# Patient Record
(demographics unavailable — no encounter records)

---

## 2025-01-04 NOTE — PHYSICAL EXAM
[Normal Coordination] : normal coordination [Normal DTR UE/LE] : normal DTR UE/LE  [Normal Sensation] : normal sensation [Normal Mood and Affect] : normal mood and affect [Oriented] : oriented [Able to Communicate] : able to communicate [Normal Skin] : normal skin [No Rash] : no rash [No Ulcers] : no ulcers [No Lesions] : no lesions [No obvious lymphadenopathy in areas examined] : no obvious lymphadenopathy in areas examined [Well Developed] : well developed [Peripheral vascular exam is grossly normal] : peripheral vascular exam is grossly normal [No Respiratory Distress] : no respiratory distress [4___] : left triceps 4[unfilled]/5 [3___] : left grasp 3[unfilled]/5 [NL (90)] : forward flexion 90 degrees [NL (30)] : right lateral rotation 30 degrees [NL (45)] : extension 45 degrees [NL (40)] : right lateral bending 40 degrees [5___] : right extensor hallicus longus 5[unfilled]/5 [Flexion] : flexion [de-identified] : RT UE intact. Left MP extensors 4/5 [] : non-antalgic

## 2025-01-04 NOTE — DISCUSSION/SUMMARY
[de-identified] : Lumbar/Cervical Spine: EMG demonstrates inflammation in multiple nerve roots. longer standing inflammation at C5/6.  There is no nerve impingement in C7 and C8. Very mild stenosis C7 nerve root. Amount of nerve compression on MRI study is not severe enough for explanation of significant weakness present in the left hand.  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 Per patient report upper extremity symptoms have improved significantly over time and he continues to treat, Patient with persistent low back pain refractory to extensive physical therapy and medical management with newer onset right lower extremity radicular pain. Last lumbar mri about 2 years ago. Referral for updated lumbar spine MRI provided to patient. Lumbar spine/core strengthening exercise program provided to patient. Cumulative encounter duration exceeded 30 minutes.  Follow up after MRI  Prior to appointment and during encounter with patient extensive medical records were reviewed including but not limited to, hospital records, outpatient records, imaging results, and lab data. During this appointment the patient was examined, diagnoses were discussed and explained in a face to face manner. In addition extensive time was spent reviewing aforementioned diagnostic studies. Counseling including abnormal image results, differential diagnoses, treatment options, risk and benefits, lifestyle changes, current condition, and current medications was performed. Patient's comments, questions, and concerns were addressed and patient verbalized understanding. Based on this patient's presentation at our office, which is an orthopedic spine surgeon's office, this patient inherently / intrinsically has a risk, however minute, of developing issues such as Cauda equina syndrome, bowel and bladder changes, or progression of motor or neurological deficits such as paralysis which may be permanent.  YOLIS AGUERO acting as a Scribe for Dr. Davon LEGER, Yolis Aguero, attest that this documentation has been prepared under the direction and in the presence of Provider Basim Mays MD.

## 2025-01-04 NOTE — HISTORY OF PRESENT ILLNESS
[Neck] : neck [Mid-back] : mid-back [9] : 9 [7] : 7 [Radiating] : radiating [Sharp] : sharp [Shooting] : shooting [Tingling] : tingling [Part time] : Work status: part time [de-identified] : 01/03/2025: Patient is here today for a follow up visit. Patient reports right side L4-L5 has worsened. Reports resolving atrophy in regards to cervical spine. Still minor numbness, tingling in left arm. Current issue is worsened pain in lower back near tailbone. He is experiencing right sided leg pain that radiates to hip, knee.  06/19/2024: patient here for follow up visit. Reports 75-90% improvement in strength. Experiences intermittent numbness in the left arm and first 3 digits of hand. Reports he lost 21 pounds through exercising. Neck pain has improved, but still reports pain in lower back. Found that the more he exercises, the more he experiences pain in hips and low back. Left side - above buttock gets stiff; right side- sciatic radiates down leg to knee. Wants to continue to exercise and be active. Pain level normally is 1-2/10, but increases with exercise. Repetitive bending causes fatigue of lower lumbar. He reports a need for more frequent stretching to keep pain at bay. Heat started making pain worse 6 months ago. Reports minor loss of strength in right leg.   08/09/2023 - Patient presenting for a FUV. Chief complaint c/t to be pain more prevalent in the left-hand and diminished  strength. States pins/needles and numbness in the left forearm region, intermittent. Wore left UE brace for 6 wks. Underwent MMRI of the left upper extremity. Follows up with Dr. Cronin, recommended to now use brace at nighttime. No neck pain at this time.   6/14/2023 - Patient presenting for a follow up and MRI Review of L spine. He reports sharp pain on the hip bone present at his last visit has significant reduced. He complains of baseline stiffness focal to the RT low back, currently controlled. Denies any frequent sciatic like symptoms.   6/2/2023 - 62 y/o M presenting for an evaluation of C, T, and L spine. Patient states he fell out of bed - this fall resulted in central low back pain radiating into left paraspinal (7/10 pain level in office today). Also c/o sharp deep left hip pain on the bone. He reports cervical neck pain subsided after PT. However, he continues to have numbness / loss of function in the left am. Weakness remains present in the left hand. Atrophy in the left hand over the last few weeks. Left hand is his dominant hand. No right upper extremity symptoms. He has discontinued PT for the past 1.5 wk since his newer lumbar issues. Taking NSAID and completing PT and pains are breaking through.   5/19/23: Pt presents to office for follow up of C and T spine. Improvements since he was last seen. No more pain in the neck, left arm, and upper back. C/o intermittent tingling in the left upper extremity, worse at nighttime and lying down. Experiencing left hip pain after massage at acupuncture. Left upper extremity strength is gradually improving day by day. PT is helpful.   4/14/23 Pt presents to office for follow up of C and T spine. He is here today to review MRI results from [facility]. He continues to report pain which affects his ADLs. The pain is mostly on the left side. He use to weight lift and run which he stopped doing. Previous XR of thoracic and cervical spine reveals arthritis. No right sided upper extremity symptoms. He is continuing PT and taking medications as prescribed.   4/7/23: 62 y/o M presenting for an initial evaluation of T & C Spine. Chief complaint is left sided neck pain radiating into the shoulder blade/throughout left arm starting 14 days ago. No trauma. States pain started after sleeping. Pain is worse while lying down.Patient reports subjective weakness in the LT UE (50 % deficit). Patient received EKG, abnormalities were rules out. No right sided upper extremity symptoms. Pt was rxd MDP with no relief, and is currently taking gabapentin 300 mg and naproxen. Prescribed muscle relaxer provided minimal relief for approx 1 hr, he has d/c. Pmhx of DDD of the lumbar spine. Completing cervical HEP. Has not started PT due to severity of his pains.     [] : no [FreeTextEntry3] : 2 weeks ago [FreeTextEntry5] : Pt p/w thoracic and cervical spine pain that began 2 weeks ago after sleeping the wrong way. pt reports radicular sxs down the left arm with numbness, weakness and tingling. Pt was rxd MDP with no relief, and is currently taking gabapentin and naproxen.\par   pmhx of DDD of the lumbar spine. [de-identified] : PCP [de-identified] : XR of cervical spine ([facility]) [de-identified] : p/t [de-identified] :

## 2025-01-04 NOTE — HISTORY OF PRESENT ILLNESS
[Neck] : neck [Mid-back] : mid-back [9] : 9 [7] : 7 [Radiating] : radiating [Sharp] : sharp [Shooting] : shooting [Tingling] : tingling [Part time] : Work status: part time [de-identified] : 01/03/2025: Patient is here today for a follow up visit. Patient reports right side L4-L5 has worsened. Reports resolving atrophy in regards to cervical spine. Still minor numbness, tingling in left arm. Current issue is worsened pain in lower back near tailbone. He is experiencing right sided leg pain that radiates to hip, knee.  06/19/2024: patient here for follow up visit. Reports 75-90% improvement in strength. Experiences intermittent numbness in the left arm and first 3 digits of hand. Reports he lost 21 pounds through exercising. Neck pain has improved, but still reports pain in lower back. Found that the more he exercises, the more he experiences pain in hips and low back. Left side - above buttock gets stiff; right side- sciatic radiates down leg to knee. Wants to continue to exercise and be active. Pain level normally is 1-2/10, but increases with exercise. Repetitive bending causes fatigue of lower lumbar. He reports a need for more frequent stretching to keep pain at bay. Heat started making pain worse 6 months ago. Reports minor loss of strength in right leg.   08/09/2023 - Patient presenting for a FUV. Chief complaint c/t to be pain more prevalent in the left-hand and diminished  strength. States pins/needles and numbness in the left forearm region, intermittent. Wore left UE brace for 6 wks. Underwent MMRI of the left upper extremity. Follows up with Dr. Cronin, recommended to now use brace at nighttime. No neck pain at this time.   6/14/2023 - Patient presenting for a follow up and MRI Review of L spine. He reports sharp pain on the hip bone present at his last visit has significant reduced. He complains of baseline stiffness focal to the RT low back, currently controlled. Denies any frequent sciatic like symptoms.   6/2/2023 - 62 y/o M presenting for an evaluation of C, T, and L spine. Patient states he fell out of bed - this fall resulted in central low back pain radiating into left paraspinal (7/10 pain level in office today). Also c/o sharp deep left hip pain on the bone. He reports cervical neck pain subsided after PT. However, he continues to have numbness / loss of function in the left am. Weakness remains present in the left hand. Atrophy in the left hand over the last few weeks. Left hand is his dominant hand. No right upper extremity symptoms. He has discontinued PT for the past 1.5 wk since his newer lumbar issues. Taking NSAID and completing PT and pains are breaking through.   5/19/23: Pt presents to office for follow up of C and T spine. Improvements since he was last seen. No more pain in the neck, left arm, and upper back. C/o intermittent tingling in the left upper extremity, worse at nighttime and lying down. Experiencing left hip pain after massage at acupuncture. Left upper extremity strength is gradually improving day by day. PT is helpful.   4/14/23 Pt presents to office for follow up of C and T spine. He is here today to review MRI results from [facility]. He continues to report pain which affects his ADLs. The pain is mostly on the left side. He use to weight lift and run which he stopped doing. Previous XR of thoracic and cervical spine reveals arthritis. No right sided upper extremity symptoms. He is continuing PT and taking medications as prescribed.   4/7/23: 62 y/o M presenting for an initial evaluation of T & C Spine. Chief complaint is left sided neck pain radiating into the shoulder blade/throughout left arm starting 14 days ago. No trauma. States pain started after sleeping. Pain is worse while lying down.Patient reports subjective weakness in the LT UE (50 % deficit). Patient received EKG, abnormalities were rules out. No right sided upper extremity symptoms. Pt was rxd MDP with no relief, and is currently taking gabapentin 300 mg and naproxen. Prescribed muscle relaxer provided minimal relief for approx 1 hr, he has d/c. Pmhx of DDD of the lumbar spine. Completing cervical HEP. Has not started PT due to severity of his pains.     [] : no [FreeTextEntry3] : 2 weeks ago [FreeTextEntry5] : Pt p/w thoracic and cervical spine pain that began 2 weeks ago after sleeping the wrong way. pt reports radicular sxs down the left arm with numbness, weakness and tingling. Pt was rxd MDP with no relief, and is currently taking gabapentin and naproxen.\par   pmhx of DDD of the lumbar spine. [de-identified] : PCP [de-identified] : XR of cervical spine ([facility]) [de-identified] : p/t [de-identified] :

## 2025-01-04 NOTE — PHYSICAL EXAM
[Normal Coordination] : normal coordination [Normal DTR UE/LE] : normal DTR UE/LE  [Normal Sensation] : normal sensation [Normal Mood and Affect] : normal mood and affect [Oriented] : oriented [Able to Communicate] : able to communicate [Normal Skin] : normal skin [No Rash] : no rash [No Ulcers] : no ulcers [No Lesions] : no lesions [No obvious lymphadenopathy in areas examined] : no obvious lymphadenopathy in areas examined [Well Developed] : well developed [Peripheral vascular exam is grossly normal] : peripheral vascular exam is grossly normal [No Respiratory Distress] : no respiratory distress [4___] : left triceps 4[unfilled]/5 [3___] : left grasp 3[unfilled]/5 [NL (90)] : forward flexion 90 degrees [NL (30)] : right lateral rotation 30 degrees [NL (45)] : extension 45 degrees [NL (40)] : right lateral bending 40 degrees [5___] : right extensor hallicus longus 5[unfilled]/5 [Flexion] : flexion [de-identified] : RT UE intact. Left MP extensors 4/5 [] : non-antalgic

## 2025-01-04 NOTE — DISCUSSION/SUMMARY
[de-identified] : Lumbar/Cervical Spine: EMG demonstrates inflammation in multiple nerve roots. longer standing inflammation at C5/6.  There is no nerve impingement in C7 and C8. Very mild stenosis C7 nerve root. Amount of nerve compression on MRI study is not severe enough for explanation of significant weakness present in the left hand.  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 Per patient report upper extremity symptoms have improved significantly over time and he continues to treat, Patient with persistent low back pain refractory to extensive physical therapy and medical management with newer onset right lower extremity radicular pain. Last lumbar mri about 2 years ago. Referral for updated lumbar spine MRI provided to patient. Lumbar spine/core strengthening exercise program provided to patient. Cumulative encounter duration exceeded 30 minutes.  Follow up after MRI  Prior to appointment and during encounter with patient extensive medical records were reviewed including but not limited to, hospital records, outpatient records, imaging results, and lab data. During this appointment the patient was examined, diagnoses were discussed and explained in a face to face manner. In addition extensive time was spent reviewing aforementioned diagnostic studies. Counseling including abnormal image results, differential diagnoses, treatment options, risk and benefits, lifestyle changes, current condition, and current medications was performed. Patient's comments, questions, and concerns were addressed and patient verbalized understanding. Based on this patient's presentation at our office, which is an orthopedic spine surgeon's office, this patient inherently / intrinsically has a risk, however minute, of developing issues such as Cauda equina syndrome, bowel and bladder changes, or progression of motor or neurological deficits such as paralysis which may be permanent.  YOLIS AGUERO acting as a Scribe for Dr. Dvaon LEGER, Yolis Aguero, attest that this documentation has been prepared under the direction and in the presence of Provider Basim Mays MD.

## 2025-01-04 NOTE — DATA REVIEWED
[EMG Nerve Conduction] : A EMG Nerve Conduction test was completed of the [Bilateral] : bilateral [Upper extremity] : upper extremity [MRI] : MRI [Cervical Spine] : cervical spine [Report was reviewed and noted in the chart] : The report was reviewed and noted in the chart [I reviewed the films/CD and additionally noted] : I reviewed the films/CD and additionally noted [I independently reviewed and interpreted images and report] : I independently reviewed and interpreted images and report [FreeTextEntry2] : in office x-rays Lumbar spine ap/lat 06/19/2024 demonstrates anterior osteophytes close to Autofusion L4/5 L5S1 w persistent multi-level DDD [FreeTextEntry3] : in office x-rays b/l hips ap/lat 06/19/2024 demonstrates reasonable preservation of hip joint cartilage  [FreeTextEntry1] : I stop paperwork reviewed PAin mgmt progress notes reviewed Hand orthopedic progress notes reviewed

## 2025-01-23 NOTE — DATA REVIEWED
[EMG Nerve Conduction] : A EMG Nerve Conduction test was completed of the [Bilateral] : bilateral [Upper extremity] : upper extremity [MRI] : MRI [Cervical Spine] : cervical spine [Report was reviewed and noted in the chart] : The report was reviewed and noted in the chart [I reviewed the films/CD and additionally noted] : I reviewed the films/CD and additionally noted [I independently reviewed and interpreted images and report] : I independently reviewed and interpreted images and report [FreeTextEntry2] : in office x-rays Lumbar spine ap/lat 06/19/2024 demonstrates anterior osteophytes close to Autofusion L4/5 L5S1 w persistent multi-level DDD [FreeTextEntry3] : in office x-rays b/l hips ap/lat 06/19/2024 demonstrates reasonable preservation of hip joint cartilage  [FreeTextEntry1] : MRI Lumbar Spine from [place] Multilevel chronic DDD, no central stenosis no nerve root compression,  Moderate bilateral foraminal stenosis L4/L5. Modic changes L4-5, L2-3, L5S1

## 2025-01-23 NOTE — HISTORY OF PRESENT ILLNESS
[Neck] : neck [Mid-back] : mid-back [9] : 9 [7] : 7 [Radiating] : radiating [Sharp] : sharp [Shooting] : shooting [Tingling] : tingling [Part time] : Work status: part time [de-identified] : 01/22/2025: Patient presents for follow up visit and review of MRI.  Patient reports when he does home exercises he does feel relief. He states his right sided leg pain is beginning to resolve slowly. He does have pressure in his lower back. Patient does not take anything for pain. He does occasionally ice and heat.   01/03/2025: Patient is here today for a follow up visit. Patient reports right side L4-L5 has worsened. Reports resolving atrophy in regards to cervical spine. Still minor numbness, tingling in left arm. Current issue is worsened pain in lower back near tailbone. He is experiencing right sided leg pain that radiates to hip, knee.  06/19/2024: patient here for follow up visit. Reports 75-90% improvement in strength. Experiences intermittent numbness in the left arm and first 3 digits of hand. Reports he lost 21 pounds through exercising. Neck pain has improved, but still reports pain in lower back. Found that the more he exercises, the more he experiences pain in hips and low back. Left side - above buttock gets stiff; right side- sciatic radiates down leg to knee. Wants to continue to exercise and be active. Pain level normally is 1-2/10, but increases with exercise. Repetitive bending causes fatigue of lower lumbar. He reports a need for more frequent stretching to keep pain at bay. Heat started making pain worse 6 months ago. Reports minor loss of strength in right leg.   08/09/2023 - Patient presenting for a FUV. Chief complaint c/t to be pain more prevalent in the left-hand and diminished  strength. States pins/needles and numbness in the left forearm region, intermittent. Wore left UE brace for 6 wks. Underwent MMRI of the left upper extremity. Follows up with Dr. Cronin, recommended to now use brace at nighttime. No neck pain at this time.   6/14/2023 - Patient presenting for a follow up and MRI Review of L spine. He reports sharp pain on the hip bone present at his last visit has significant reduced. He complains of baseline stiffness focal to the RT low back, currently controlled. Denies any frequent sciatic like symptoms.   6/2/2023 - 60 y/o M presenting for an evaluation of C, T, and L spine. Patient states he fell out of bed - this fall resulted in central low back pain radiating into left paraspinal (7/10 pain level in office today). Also c/o sharp deep left hip pain on the bone. He reports cervical neck pain subsided after PT. However, he continues to have numbness / loss of function in the left am. Weakness remains present in the left hand. Atrophy in the left hand over the last few weeks. Left hand is his dominant hand. No right upper extremity symptoms. He has discontinued PT for the past 1.5 wk since his newer lumbar issues. Taking NSAID and completing PT and pains are breaking through.   5/19/23: Pt presents to office for follow up of C and T spine. Improvements since he was last seen. No more pain in the neck, left arm, and upper back. C/o intermittent tingling in the left upper extremity, worse at nighttime and lying down. Experiencing left hip pain after massage at acupuncture. Left upper extremity strength is gradually improving day by day. PT is helpful.   4/14/23 Pt presents to office for follow up of C and T spine. He is here today to review MRI results from [facility]. He continues to report pain which affects his ADLs. The pain is mostly on the left side. He use to weight lift and run which he stopped doing. Previous XR of thoracic and cervical spine reveals arthritis. No right sided upper extremity symptoms. He is continuing PT and taking medications as prescribed.   4/7/23: 60 y/o M presenting for an initial evaluation of T & C Spine. Chief complaint is left sided neck pain radiating into the shoulder blade/throughout left arm starting 14 days ago. No trauma. States pain started after sleeping. Pain is worse while lying down.Patient reports subjective weakness in the LT UE (50 % deficit). Patient received EKG, abnormalities were rules out. No right sided upper extremity symptoms. Pt was rxd MDP with no relief, and is currently taking gabapentin 300 mg and naproxen. Prescribed muscle relaxer provided minimal relief for approx 1 hr, he has d/c. Pmhx of DDD of the lumbar spine. Completing cervical HEP. Has not started PT due to severity of his pains.     [] : no [FreeTextEntry3] : 2 weeks ago [FreeTextEntry5] : Pt p/w thoracic and cervical spine pain that began 2 weeks ago after sleeping the wrong way. pt reports radicular sxs down the left arm with numbness, weakness and tingling. Pt was rxd MDP with no relief, and is currently taking gabapentin and naproxen.\par   pmhx of DDD of the lumbar spine. [de-identified] : PCP [de-identified] : XR of cervical spine ([facility]) [de-identified] : p/t [de-identified] :

## 2025-01-23 NOTE — HISTORY OF PRESENT ILLNESS
[Neck] : neck [Mid-back] : mid-back [9] : 9 [7] : 7 [Radiating] : radiating [Sharp] : sharp [Shooting] : shooting [Tingling] : tingling [Part time] : Work status: part time [de-identified] : 01/22/2025: Patient presents for follow up visit and review of MRI.  Patient reports when he does home exercises he does feel relief. He states his right sided leg pain is beginning to resolve slowly. He does have pressure in his lower back. Patient does not take anything for pain. He does occasionally ice and heat.   01/03/2025: Patient is here today for a follow up visit. Patient reports right side L4-L5 has worsened. Reports resolving atrophy in regards to cervical spine. Still minor numbness, tingling in left arm. Current issue is worsened pain in lower back near tailbone. He is experiencing right sided leg pain that radiates to hip, knee.  06/19/2024: patient here for follow up visit. Reports 75-90% improvement in strength. Experiences intermittent numbness in the left arm and first 3 digits of hand. Reports he lost 21 pounds through exercising. Neck pain has improved, but still reports pain in lower back. Found that the more he exercises, the more he experiences pain in hips and low back. Left side - above buttock gets stiff; right side- sciatic radiates down leg to knee. Wants to continue to exercise and be active. Pain level normally is 1-2/10, but increases with exercise. Repetitive bending causes fatigue of lower lumbar. He reports a need for more frequent stretching to keep pain at bay. Heat started making pain worse 6 months ago. Reports minor loss of strength in right leg.   08/09/2023 - Patient presenting for a FUV. Chief complaint c/t to be pain more prevalent in the left-hand and diminished  strength. States pins/needles and numbness in the left forearm region, intermittent. Wore left UE brace for 6 wks. Underwent MMRI of the left upper extremity. Follows up with Dr. Cronin, recommended to now use brace at nighttime. No neck pain at this time.   6/14/2023 - Patient presenting for a follow up and MRI Review of L spine. He reports sharp pain on the hip bone present at his last visit has significant reduced. He complains of baseline stiffness focal to the RT low back, currently controlled. Denies any frequent sciatic like symptoms.   6/2/2023 - 62 y/o M presenting for an evaluation of C, T, and L spine. Patient states he fell out of bed - this fall resulted in central low back pain radiating into left paraspinal (7/10 pain level in office today). Also c/o sharp deep left hip pain on the bone. He reports cervical neck pain subsided after PT. However, he continues to have numbness / loss of function in the left am. Weakness remains present in the left hand. Atrophy in the left hand over the last few weeks. Left hand is his dominant hand. No right upper extremity symptoms. He has discontinued PT for the past 1.5 wk since his newer lumbar issues. Taking NSAID and completing PT and pains are breaking through.   5/19/23: Pt presents to office for follow up of C and T spine. Improvements since he was last seen. No more pain in the neck, left arm, and upper back. C/o intermittent tingling in the left upper extremity, worse at nighttime and lying down. Experiencing left hip pain after massage at acupuncture. Left upper extremity strength is gradually improving day by day. PT is helpful.   4/14/23 Pt presents to office for follow up of C and T spine. He is here today to review MRI results from [facility]. He continues to report pain which affects his ADLs. The pain is mostly on the left side. He use to weight lift and run which he stopped doing. Previous XR of thoracic and cervical spine reveals arthritis. No right sided upper extremity symptoms. He is continuing PT and taking medications as prescribed.   4/7/23: 62 y/o M presenting for an initial evaluation of T & C Spine. Chief complaint is left sided neck pain radiating into the shoulder blade/throughout left arm starting 14 days ago. No trauma. States pain started after sleeping. Pain is worse while lying down.Patient reports subjective weakness in the LT UE (50 % deficit). Patient received EKG, abnormalities were rules out. No right sided upper extremity symptoms. Pt was rxd MDP with no relief, and is currently taking gabapentin 300 mg and naproxen. Prescribed muscle relaxer provided minimal relief for approx 1 hr, he has d/c. Pmhx of DDD of the lumbar spine. Completing cervical HEP. Has not started PT due to severity of his pains.     [] : no [FreeTextEntry3] : 2 weeks ago [FreeTextEntry5] : Pt p/w thoracic and cervical spine pain that began 2 weeks ago after sleeping the wrong way. pt reports radicular sxs down the left arm with numbness, weakness and tingling. Pt was rxd MDP with no relief, and is currently taking gabapentin and naproxen.\par   pmhx of DDD of the lumbar spine. [de-identified] : PCP [de-identified] : XR of cervical spine ([facility]) [de-identified] : p/t [de-identified] :

## 2025-01-23 NOTE — PHYSICAL EXAM
[Normal Coordination] : normal coordination [Normal DTR UE/LE] : normal DTR UE/LE  [Normal Sensation] : normal sensation [Normal Mood and Affect] : normal mood and affect [Oriented] : oriented [Able to Communicate] : able to communicate [Normal Skin] : normal skin [No Rash] : no rash [No Ulcers] : no ulcers [No Lesions] : no lesions [No obvious lymphadenopathy in areas examined] : no obvious lymphadenopathy in areas examined [Well Developed] : well developed [Peripheral vascular exam is grossly normal] : peripheral vascular exam is grossly normal [No Respiratory Distress] : no respiratory distress [4___] : left triceps 4[unfilled]/5 [3___] : left grasp 3[unfilled]/5 [NL (90)] : forward flexion 90 degrees [NL (30)] : right lateral rotation 30 degrees [NL (45)] : extension 45 degrees [NL (40)] : right lateral bending 40 degrees [Flexion] : flexion [5___] : right extensor hallicus longus 5[unfilled]/5 [de-identified] : RT UE intact. Left MP extensors 4/5 [] : non-antalgic

## 2025-01-23 NOTE — PHYSICAL EXAM
[Normal Coordination] : normal coordination [Normal DTR UE/LE] : normal DTR UE/LE  [Normal Sensation] : normal sensation [Normal Mood and Affect] : normal mood and affect [Oriented] : oriented [Able to Communicate] : able to communicate [Normal Skin] : normal skin [No Rash] : no rash [No Ulcers] : no ulcers [No Lesions] : no lesions [No obvious lymphadenopathy in areas examined] : no obvious lymphadenopathy in areas examined [Well Developed] : well developed [Peripheral vascular exam is grossly normal] : peripheral vascular exam is grossly normal [No Respiratory Distress] : no respiratory distress [4___] : left triceps 4[unfilled]/5 [3___] : left grasp 3[unfilled]/5 [NL (90)] : forward flexion 90 degrees [NL (30)] : right lateral rotation 30 degrees [NL (45)] : extension 45 degrees [NL (40)] : right lateral bending 40 degrees [Flexion] : flexion [5___] : right extensor hallicus longus 5[unfilled]/5 [de-identified] : RT UE intact. Left MP extensors 4/5 [] : non-antalgic

## 2025-01-23 NOTE — DATA REVIEWED
[EMG Nerve Conduction] : A EMG Nerve Conduction test was completed of the [Bilateral] : bilateral [Upper extremity] : upper extremity [MRI] : MRI [Cervical Spine] : cervical spine [Report was reviewed and noted in the chart] : The report was reviewed and noted in the chart [I reviewed the films/CD and additionally noted] : I reviewed the films/CD and additionally noted [I independently reviewed and interpreted images and report] : I independently reviewed and interpreted images and report [FreeTextEntry2] : in office x-rays Lumbar spine ap/lat 06/19/2024 demonstrates anterior osteophytes close to Autofusion L4/5 L5S1 w persistent multi-level DDD [FreeTextEntry3] : in office x-rays b/l hips ap/lat 06/19/2024 demonstrates reasonable preservation of hip joint cartilage  [FreeTextEntry1] : MRI Lumbar Spine from [place] Multilevel chronic DDD, no central stenosis no nerve root compression,  Moderate bilateral foraminal stenosis L4/L5. Modic changes L4-5, L2-3, L5S1

## 2025-01-23 NOTE — DISCUSSION/SUMMARY
[de-identified] : Reviewed and discussed MRI results of lumbar spine. Discussed with patient seeing pain management to further discuss a basivertebral nerve ablation procedure can be an option to help alleviate pain. Referral for Dr. Monique given today. Discussed with patient that surgical intervention is an option, but we will try conservative treatment first, given that surgery would likely require a four level lumbar decompression and fusion with extended recovery time and may not meet patient expectations with respect to symptom relief..  Continue Lumbar spine/core strengthening exercise program provided to patient. Cumulative encounter duration exceeded 30 minutes.  Follow up:  1 month  Prior to appointment and during encounter with patient extensive medical records were reviewed including but not limited to, hospital records, outpatient records, imaging results, and lab data. During this appointment the patient was examined, diagnoses were discussed and explained in a face to face manner. In addition, extensive time was spent reviewing aforementioned diagnostic studies. Counseling including abnormal image results, differential diagnoses, treatment options, risk and benefits, lifestyle changes, current condition, and current medications was performed. Patient's comments, questions, and concerns were addressed and patient verbalized understanding. Based on this patient's presentation at our office, which is an orthopedic spine surgeon's office, this patient inherently / intrinsically has a risk, however minute, of developing issues such as Cauda equina syndrome, bowel and bladder changes, or progression of motor or neurological deficits such as paralysis which may be permanent.   I, Genna Lopez, attest that this documentation has been prepared under the direction and in the presence of Provider Basim Mays MD.

## 2025-01-23 NOTE — DISCUSSION/SUMMARY
[de-identified] : Reviewed and discussed MRI results of lumbar spine. Discussed with patient seeing pain management to further discuss a basivertebral nerve ablation procedure can be an option to help alleviate pain. Referral for Dr. Monique given today. Discussed with patient that surgical intervention is an option, but we will try conservative treatment first, given that surgery would likely require a four level lumbar decompression and fusion with extended recovery time and may not meet patient expectations with respect to symptom relief..  Continue Lumbar spine/core strengthening exercise program provided to patient. Cumulative encounter duration exceeded 30 minutes.  Follow up:  1 month  Prior to appointment and during encounter with patient extensive medical records were reviewed including but not limited to, hospital records, outpatient records, imaging results, and lab data. During this appointment the patient was examined, diagnoses were discussed and explained in a face to face manner. In addition, extensive time was spent reviewing aforementioned diagnostic studies. Counseling including abnormal image results, differential diagnoses, treatment options, risk and benefits, lifestyle changes, current condition, and current medications was performed. Patient's comments, questions, and concerns were addressed and patient verbalized understanding. Based on this patient's presentation at our office, which is an orthopedic spine surgeon's office, this patient inherently / intrinsically has a risk, however minute, of developing issues such as Cauda equina syndrome, bowel and bladder changes, or progression of motor or neurological deficits such as paralysis which may be permanent.   I, Genna Lopez, attest that this documentation has been prepared under the direction and in the presence of Provider Basim Mays MD.

## 2025-02-07 NOTE — PHYSICAL EXAM
[de-identified] : Constitutional:   - No acute distress   - Well developed; well nourished    Neurological:   - normal mood and affect   - alert and oriented x 3     Cardiovascular:   - grossly normal   Lumbar Spine Exam:   Inspection: erythema (-) ecchymosis (-) rashes (-) alignment: no scoliosis   Palpation: Midline lumbar tenderness:            (-) midline thoracic tenderness:          (-) Lumbar paraspinal tenderness:      L (-); R (-) thoracic paraspinal tenderness:     L (-); R (-) sciatic nerve tenderness :              L (-); R (-) SI joint tenderness:                        L (-); R (-) GTB tenderness:                            L (-); R (-)   ROM:  WNL   Strength:                                    Right       Left  Hip Flexion:                (5/5)       (5/5) Quadriceps:               (5/5)       (5/5) Hamstrings:                (5/5)       (5/5) Ankle Dorsiflexion:     (5/5)       (5/5) EHL:                           (5/5)       (5/5) Ankle Plantarflexion:  (5/5)       (5/5)   Special Tests: SLR:                           R (-) ; L (-) Facet loading:            R (-) ; L (-) EUNICE test:               R (n/a) ; L (n/a) Hamstring tightness:  R (-);  L (-)   Neurologic: SI LT throughout right lower extremity SI LT throughout left lower extremity   Reflexes normal and symmetric bilateral lower extremities   Gait: non- antalgic gait ambulates without assistive device
normal...

## 2025-02-07 NOTE — ASSESSMENT
[FreeTextEntry1] : A discussion regarding available pain management treatment options occurred with the patient.  These included interventional, rehabilitative, pharmacological, and alternative modalities. We will proceed with the following:    Interventional treatment options:   - Proceed with TPI today for acute lumbar myofascial strain - Can consider left PM C7-T1 VIOLETTA pending review of EMG/NCV study - Explained potential diagnostic and therapeutic role for indicated procedure - see additional instructions below    Rehabilitative options: - continue physical therapy   - participation in active HEP was discussed    Medication based treatment options:   - Continue naproxen 500 mg up to BID as needed - continue methocarbamol 750 mg up to BID as needed for spasm - see additional instructions below    Complementary treatment options:   - Weight management and lifestyle modifications discussed   - See additional instructions below    Additional treatment recommendations as follows:   - Agree with EMG/NCV for left upper extremity weakness/atrophy - Obtain MRI lumbar spine - Follow-up for diagnostic study review  The risks, benefits and alternatives of the proposed procedure were explained in detail with the patient.  The risks outlined include, but are not limited to, infection, bleeding, nerve injury, a temporary increase in pain, failure to resolve symptoms, allergic reaction, and possible elevation of blood sugar in diabetics.  All questions were answered to patient's apparent satisfaction and he/she verbalized an understanding.  We have discussed the risks, benefits, and alternatives NSAID therapy including but not limited to the risk of bleeding, thrombosis, gastric mucosal irritation/ulceration, allergic reaction and kidney dysfunction; the patient verbalizes an understanding.  The documentation recorded by the scribe, in my presence, accurately reflects the service I personally performed and the decisions made by me with my edits as appropriate.   I, Gina Moreno, acting as scribe, attest that this documentation has been prepared under the direction and in the presence of Provider Ubaldo Monique DO.

## 2025-02-07 NOTE — HISTORY OF PRESENT ILLNESS
[FreeTextEntry1] : 2025 - Patient presents for reevaluation.  Last seen nearly 2 years ago.  2023 - The patient presents for initial evaluation regarding their neck pain.  Patient was referred by Dr. Mays.  Patient with history of chronic neck pain with recent exacerbation of the previous 2 months.  Denies any particular exacerbating and or inciting events.  His current pain is in the neck with radicular pain to the left arm.  There is associated paresthesias of the arm extending to the hand.  He also notes significant muscle atrophy of the hand and subjective strength loss.  Patient has been through PT with some benefit.  Of note patient also complains of chronic low back pain with recent exacerbation while in physical therapy.  This is actually more of a concern for him at today's visit than his cervical spine.  Pain remains focal to the lumbar spine without significant radiation to the buttock or lower extremities.  Patient is scheduled for a MRI of his lumbar spine this week.  Injections: No    Pertinent Surgical History: N/A   Imagin) MRI Cervical Spine (2023) - [facility]  2) MRI lumbar spine (1/15/2025) - [place] [facility]  Physician Disclaimer: I have personally reviewed and confirmed all HPI data with the patient.

## 2025-03-06 NOTE — ASSESSMENT
[FreeTextEntry1] : We discussed the nature of the underlying pathology and available pain management treatment options. These included interventional, rehabilitative, pharmacological, and complementary modalities. We will proceed with the following:    Interventional treatment options: - Proceed with _ with fluoroscopic guidance - None indicated at present time  - see additional instructions below    Rehabilitative options: - Initiate trial of physical therapy - Continue physical therapy - Participation in active HEP was discussed and encouraged as tolerated - Home exercise sheet provided   Medication based treatment options: - Initiate trial of _ - Continue _ - See additional instructions below    Complementary treatment options: - Weight management and lifestyle modifications discussed   Additional treatment recommendations as follows: - patient will follow up _  IGina, acting as scribe, attest that this documentation has been prepared under the direction and in the presence of Provider Ubaldo Monique DO.  The documentation recorded by the scribe, in my presence, accurately reflects the service I personally performed, and the decisions made by me with my edits as appropriate.

## 2025-03-06 NOTE — PHYSICAL EXAM
[de-identified] : Constitutional: - No acute distress - Well developed; well nourished   Neurological: - normal mood and affect - alert and oriented x 3   Cardiovascular: - grossly normal  Lumbar Spine Exam:   Inspection: erythema (-) ecchymosis (-) rashes (-) alignment: no scoliosis   Palpation: Midline lumbar tenderness:            (-) midline thoracic tenderness:          (-) Lumbar paraspinal tenderness:      L (-); R (-) thoracic paraspinal tenderness:     L (-); R (-) sciatic nerve tenderness :              L (-); R (-) SI joint tenderness:                        L (-); R (-) GTB tenderness:                            L (-); R (-)   ROM:  WNL   Strength:                                    Right       Left  Hip Flexion:                (5/5)       (5/5) Quadriceps:               (5/5)       (5/5) Hamstrings:                (5/5)       (5/5) Ankle Dorsiflexion:     (5/5)       (5/5) EHL:                           (5/5)       (5/5) Ankle Plantarflexion:  (5/5)       (5/5)   Special Tests: SLR:                           R (-) ; L (-) Facet loading:            R (-) ; L (-) EUNICE test:               R (n/a) ; L (n/a) Hamstring tightness:  R (-);  L (-)   Neurologic: SI LT throughout right lower extremity SI LT throughout left lower extremity   Reflexes normal and symmetric bilateral lower extremities   Gait: non- antalgic gait ambulates without assistive device

## 2025-05-18 NOTE — DATA REVIEWED
[EMG Nerve Conduction] : A EMG Nerve Conduction test was completed of the [Bilateral] : bilateral [Upper extremity] : upper extremity [MRI] : MRI [Cervical Spine] : cervical spine [Report was reviewed and noted in the chart] : The report was reviewed and noted in the chart [I reviewed the films/CD and additionally noted] : I reviewed the films/CD and additionally noted [I independently reviewed and interpreted images and report] : I independently reviewed and interpreted images and report [FreeTextEntry2] : in office x-rays Lumbar spine ap/lat 05/16/2025 demonstrates anterior osteophytes close to Autofusion L4/5 L5S1 w persistent multi-level DDD. No change since prior XR [FreeTextEntry3] : in office x-rays b/l hips ap/lat 06/19/2024 demonstrates reasonable preservation of hip joint cartilage  [FreeTextEntry1] : MRI Lumbar Spine from [place] Multilevel chronic DDD, no central stenosis no nerve root compression,  Moderate bilateral foraminal stenosis L4/L5. Modic changes L4-5, L2-3, L5S1  I stop paperwork reviewed PT progress notes reviewed

## 2025-05-18 NOTE — HISTORY OF PRESENT ILLNESS
[de-identified] : 05/16/2025 - Patient presenting for follow up visit. He reports improvement in atrophy and weakness in his LUE. Completed 3-5 months of PT and now continues active exercise based rehabilitation. About 2 wks ago he reports he fell which he feels exacerbated his LUE symptoms. Complains of radiating pain from left elbow into his first 3 digits, which he is able to trigger with cervical rom. He does not feel his strength has worsened. Treated with ibuprofen for 1.5 wk, stopped 3-4 days ago. Of note reports increase in LUE numbness starting 4 wks ago with increase in physical activities. Also complains increase in baseline level of pain in the low back.   01/22/2025: Patient presents for follow up visit and review of MRI.  Patient reports when he does home exercises he does feel relief. He states his right sided leg pain is beginning to resolve slowly. He does have pressure in his lower back. Patient does not take anything for pain. He does occasionally ice and heat.   01/03/2025: Patient is here today for a follow up visit. Patient reports right side L4-L5 has worsened. Reports resolving atrophy in regards to cervical spine. Still minor numbness, tingling in left arm. Current issue is worsened pain in lower back near tailbone. He is experiencing right sided leg pain that radiates to hip, knee.  06/19/2024: patient here for follow up visit. Reports 75-90% improvement in strength. Experiences intermittent numbness in the left arm and first 3 digits of hand. Reports he lost 21 pounds through exercising. Neck pain has improved, but still reports pain in lower back. Found that the more he exercises, the more he experiences pain in hips and low back. Left side - above buttock gets stiff; right side- sciatic radiates down leg to knee. Wants to continue to exercise and be active. Pain level normally is 1-2/10, but increases with exercise. Repetitive bending causes fatigue of lower lumbar. He reports a need for more frequent stretching to keep pain at bay. Heat started making pain worse 6 months ago. Reports minor loss of strength in right leg.   08/09/2023 - Patient presenting for a FUV. Chief complaint c/t to be pain more prevalent in the left-hand and diminished  strength. States pins/needles and numbness in the left forearm region, intermittent. Wore left UE brace for 6 wks. Underwent MMRI of the left upper extremity. Follows up with Dr. Cronin, recommended to now use brace at nighttime. No neck pain at this time.   6/14/2023 - Patient presenting for a follow up and MRI Review of L spine. He reports sharp pain on the hip bone present at his last visit has significant reduced. He complains of baseline stiffness focal to the RT low back, currently controlled. Denies any frequent sciatic like symptoms.   6/2/2023 - 60 y/o M presenting for an evaluation of C, T, and L spine. Patient states he fell out of bed - this fall resulted in central low back pain radiating into left paraspinal (7/10 pain level in office today). Also c/o sharp deep left hip pain on the bone. He reports cervical neck pain subsided after PT. However, he continues to have numbness / loss of function in the left am. Weakness remains present in the left hand. Atrophy in the left hand over the last few weeks. Left hand is his dominant hand. No right upper extremity symptoms. He has discontinued PT for the past 1.5 wk since his newer lumbar issues. Taking NSAID and completing PT and pains are breaking through.   5/19/23: Pt presents to office for follow up of C and T spine. Improvements since he was last seen. No more pain in the neck, left arm, and upper back. C/o intermittent tingling in the left upper extremity, worse at nighttime and lying down. Experiencing left hip pain after massage at acupuncture. Left upper extremity strength is gradually improving day by day. PT is helpful.   4/14/23 Pt presents to office for follow up of C and T spine. He is here today to review MRI results from [facility]. He continues to report pain which affects his ADLs. The pain is mostly on the left side. He use to weight lift and run which he stopped doing. Previous XR of thoracic and cervical spine reveals arthritis. No right sided upper extremity symptoms. He is continuing PT and taking medications as prescribed.   4/7/23: 60 y/o M presenting for an initial evaluation of T & C Spine. Chief complaint is left sided neck pain radiating into the shoulder blade/throughout left arm starting 14 days ago. No trauma. States pain started after sleeping. Pain is worse while lying down.Patient reports subjective weakness in the LT UE (50 % deficit). Patient received EKG, abnormalities were rules out. No right sided upper extremity symptoms. Pt was rxd MDP with no relief, and is currently taking gabapentin 300 mg and naproxen. Prescribed muscle relaxer provided minimal relief for approx 1 hr, he has d/c. Pmhx of DDD of the lumbar spine. Completing cervical HEP. Has not started PT due to severity of his pains.

## 2025-05-18 NOTE — PHYSICAL EXAM
[Normal Coordination] : normal coordination [Normal DTR UE/LE] : normal DTR UE/LE  [Normal Sensation] : normal sensation [Normal Mood and Affect] : normal mood and affect [Oriented] : oriented [Able to Communicate] : able to communicate [Normal Skin] : normal skin [No Rash] : no rash [No Ulcers] : no ulcers [No Lesions] : no lesions [No obvious lymphadenopathy in areas examined] : no obvious lymphadenopathy in areas examined [Well Developed] : well developed [Peripheral vascular exam is grossly normal] : peripheral vascular exam is grossly normal [No Respiratory Distress] : no respiratory distress [4___] : left triceps 4[unfilled]/5 [3___] : left grasp 3[unfilled]/5 [NL (90)] : forward flexion 90 degrees [NL (30)] : right lateral rotation 30 degrees [NL (45)] : extension 45 degrees [NL (40)] : right lateral bending 40 degrees [Flexion] : flexion [5___] : right extensor hallicus longus 5[unfilled]/5 [de-identified] : RT UE intact. Left MP extensors 4/5 [] : non-antalgic

## 2025-05-18 NOTE — HISTORY OF PRESENT ILLNESS
[de-identified] : 05/16/2025 - Patient presenting for follow up visit. He reports improvement in atrophy and weakness in his LUE. Completed 3-5 months of PT and now continues active exercise based rehabilitation. About 2 wks ago he reports he fell which he feels exacerbated his LUE symptoms. Complains of radiating pain from left elbow into his first 3 digits, which he is able to trigger with cervical rom. He does not feel his strength has worsened. Treated with ibuprofen for 1.5 wk, stopped 3-4 days ago. Of note reports increase in LUE numbness starting 4 wks ago with increase in physical activities. Also complains increase in baseline level of pain in the low back.   01/22/2025: Patient presents for follow up visit and review of MRI.  Patient reports when he does home exercises he does feel relief. He states his right sided leg pain is beginning to resolve slowly. He does have pressure in his lower back. Patient does not take anything for pain. He does occasionally ice and heat.   01/03/2025: Patient is here today for a follow up visit. Patient reports right side L4-L5 has worsened. Reports resolving atrophy in regards to cervical spine. Still minor numbness, tingling in left arm. Current issue is worsened pain in lower back near tailbone. He is experiencing right sided leg pain that radiates to hip, knee.  06/19/2024: patient here for follow up visit. Reports 75-90% improvement in strength. Experiences intermittent numbness in the left arm and first 3 digits of hand. Reports he lost 21 pounds through exercising. Neck pain has improved, but still reports pain in lower back. Found that the more he exercises, the more he experiences pain in hips and low back. Left side - above buttock gets stiff; right side- sciatic radiates down leg to knee. Wants to continue to exercise and be active. Pain level normally is 1-2/10, but increases with exercise. Repetitive bending causes fatigue of lower lumbar. He reports a need for more frequent stretching to keep pain at bay. Heat started making pain worse 6 months ago. Reports minor loss of strength in right leg.   08/09/2023 - Patient presenting for a FUV. Chief complaint c/t to be pain more prevalent in the left-hand and diminished  strength. States pins/needles and numbness in the left forearm region, intermittent. Wore left UE brace for 6 wks. Underwent MMRI of the left upper extremity. Follows up with Dr. Cronin, recommended to now use brace at nighttime. No neck pain at this time.   6/14/2023 - Patient presenting for a follow up and MRI Review of L spine. He reports sharp pain on the hip bone present at his last visit has significant reduced. He complains of baseline stiffness focal to the RT low back, currently controlled. Denies any frequent sciatic like symptoms.   6/2/2023 - 60 y/o M presenting for an evaluation of C, T, and L spine. Patient states he fell out of bed - this fall resulted in central low back pain radiating into left paraspinal (7/10 pain level in office today). Also c/o sharp deep left hip pain on the bone. He reports cervical neck pain subsided after PT. However, he continues to have numbness / loss of function in the left am. Weakness remains present in the left hand. Atrophy in the left hand over the last few weeks. Left hand is his dominant hand. No right upper extremity symptoms. He has discontinued PT for the past 1.5 wk since his newer lumbar issues. Taking NSAID and completing PT and pains are breaking through.   5/19/23: Pt presents to office for follow up of C and T spine. Improvements since he was last seen. No more pain in the neck, left arm, and upper back. C/o intermittent tingling in the left upper extremity, worse at nighttime and lying down. Experiencing left hip pain after massage at acupuncture. Left upper extremity strength is gradually improving day by day. PT is helpful.   4/14/23 Pt presents to office for follow up of C and T spine. He is here today to review MRI results from [facility]. He continues to report pain which affects his ADLs. The pain is mostly on the left side. He use to weight lift and run which he stopped doing. Previous XR of thoracic and cervical spine reveals arthritis. No right sided upper extremity symptoms. He is continuing PT and taking medications as prescribed.   4/7/23: 60 y/o M presenting for an initial evaluation of T & C Spine. Chief complaint is left sided neck pain radiating into the shoulder blade/throughout left arm starting 14 days ago. No trauma. States pain started after sleeping. Pain is worse while lying down.Patient reports subjective weakness in the LT UE (50 % deficit). Patient received EKG, abnormalities were rules out. No right sided upper extremity symptoms. Pt was rxd MDP with no relief, and is currently taking gabapentin 300 mg and naproxen. Prescribed muscle relaxer provided minimal relief for approx 1 hr, he has d/c. Pmhx of DDD of the lumbar spine. Completing cervical HEP. Has not started PT due to severity of his pains.

## 2025-05-18 NOTE — PHYSICAL EXAM
[Normal Coordination] : normal coordination [Normal DTR UE/LE] : normal DTR UE/LE  [Normal Sensation] : normal sensation [Normal Mood and Affect] : normal mood and affect [Oriented] : oriented [Able to Communicate] : able to communicate [Normal Skin] : normal skin [No Rash] : no rash [No Ulcers] : no ulcers [No Lesions] : no lesions [No obvious lymphadenopathy in areas examined] : no obvious lymphadenopathy in areas examined [Well Developed] : well developed [Peripheral vascular exam is grossly normal] : peripheral vascular exam is grossly normal [No Respiratory Distress] : no respiratory distress [4___] : left triceps 4[unfilled]/5 [3___] : left grasp 3[unfilled]/5 [NL (90)] : forward flexion 90 degrees [NL (30)] : right lateral rotation 30 degrees [NL (45)] : extension 45 degrees [NL (40)] : right lateral bending 40 degrees [Flexion] : flexion [5___] : right extensor hallicus longus 5[unfilled]/5 [de-identified] : RT UE intact. Left MP extensors 4/5 [] : non-antalgic

## 2025-05-18 NOTE — DISCUSSION/SUMMARY
[de-identified] : Reviewed and discussed lumbar XR taken today. Discussed continuing ibuprofen next couple of weeks for pain relief. If LUE symptoms persist, discussed ordering updated cervical MRI. Pt will consider Lumbar MBB/RFA vs  intracept procedure. Discussed with patient that surgical intervention is an option, but we will try conservative treatment first, given that surgery would likely require a four level lumbar decompression and fusion with extended recovery time and may not meet patient expectations with respect to symptom relief. Continue Lumbar spine/core strengthening exercise program provided to patient. Cumulative encounter duration exceeded 30 minutes.  Follow up: 6 wks   Prior to appointment and during encounter with patient extensive medical records were reviewed including but not limited to, hospital records, outpatient records, imaging results, and lab data. During this appointment the patient was examined, diagnoses were discussed and explained in a face to face manner. In addition, extensive time was spent reviewing aforementioned diagnostic studies. Counseling including abnormal image results, differential diagnoses, treatment options, risk and benefits, lifestyle changes, current condition, and current medications was performed. Patient's comments, questions, and concerns were addressed and patient verbalized understanding. Based on this patient's presentation at our office, which is an orthopedic spine surgeon's office, this patient inherently / intrinsically has a risk, however minute, of developing issues such as Cauda equina syndrome, bowel and bladder changes, or progression of motor or neurological deficits such as paralysis which may be permanent.   KELLY ALAN Acting as a Scribe for Dr. Davon LEGER, Kelly Alan, attest that this documentation has been prepared under the direction and in the presence of Provider Basim Mays MD.

## 2025-05-18 NOTE — DISCUSSION/SUMMARY
[de-identified] : Reviewed and discussed lumbar XR taken today. Discussed continuing ibuprofen next couple of weeks for pain relief. If LUE symptoms persist, discussed ordering updated cervical MRI. Pt will consider Lumbar MBB/RFA vs  intracept procedure. Discussed with patient that surgical intervention is an option, but we will try conservative treatment first, given that surgery would likely require a four level lumbar decompression and fusion with extended recovery time and may not meet patient expectations with respect to symptom relief. Continue Lumbar spine/core strengthening exercise program provided to patient. Cumulative encounter duration exceeded 30 minutes.  Follow up: 6 wks   Prior to appointment and during encounter with patient extensive medical records were reviewed including but not limited to, hospital records, outpatient records, imaging results, and lab data. During this appointment the patient was examined, diagnoses were discussed and explained in a face to face manner. In addition, extensive time was spent reviewing aforementioned diagnostic studies. Counseling including abnormal image results, differential diagnoses, treatment options, risk and benefits, lifestyle changes, current condition, and current medications was performed. Patient's comments, questions, and concerns were addressed and patient verbalized understanding. Based on this patient's presentation at our office, which is an orthopedic spine surgeon's office, this patient inherently / intrinsically has a risk, however minute, of developing issues such as Cauda equina syndrome, bowel and bladder changes, or progression of motor or neurological deficits such as paralysis which may be permanent.   KELLY ALAN Acting as a Scribe for Dr. Davon LEGER, Kelly Alan, attest that this documentation has been prepared under the direction and in the presence of Provider Basim Mays MD.

## 2025-05-18 NOTE — DATA REVIEWED
[EMG Nerve Conduction] : A EMG Nerve Conduction test was completed of the [Bilateral] : bilateral [Upper extremity] : upper extremity [MRI] : MRI [Cervical Spine] : cervical spine [Report was reviewed and noted in the chart] : The report was reviewed and noted in the chart [I reviewed the films/CD and additionally noted] : I reviewed the films/CD and additionally noted [I independently reviewed and interpreted images and report] : I independently reviewed and interpreted images and report [FreeTextEntry2] : in office x-rays Lumbar spine ap/lat 05/16/2025 demonstrates anterior osteophytes close to Autofusion L4/5 L5S1 w persistent multi-level DDD. No change since prior XR [FreeTextEntry3] : in office x-rays b/l hips ap/lat 06/19/2024 demonstrates reasonable preservation of hip joint cartilage  [FreeTextEntry1] : MRI Lumbar Spine from [place] Multilevel chronic DDD, no central stenosis no nerve root compression,  Moderate bilateral foraminal stenosis L4/L5. Modic changes L4-5, L2-3, L5S1  I stop paperwork reviewed PT progress notes reviewed

## 2025-05-19 NOTE — HISTORY OF PRESENT ILLNESS
[Neck] : neck [Lower back] : lower back [4] : 4 [3] : 3 [Radiating] : radiating [Sharp] : sharp [Tightness] : tightness [Tingling] : tingling [Constant] : constant [Household chores] : household chores [Leisure] : leisure [Sleep] : sleep [FreeTextEntry1] : 2025 - Patient presents for reevaluation; last seen about 2 years ago.  Patient reports he continues to have low back pain which he feels is limiting his ability to participate in day-to-day activities. His pain is across his low back, with occasional radiation to his right leg. His back pain is his predominant concern at this time. He has done formal PT for his low back and continues HEP without meaningful benefit. He also reports he is getting worsening numbness/tingling in his left upper extremity, down to his hand.  2023 - The patient presents for initial evaluation regarding their neck pain.  Patient was referred by Dr. Mays.  Patient with history of chronic neck pain with recent exacerbation of the previous 2 months.  Denies any particular exacerbating and or inciting events.  His current pain is in the neck with radicular pain to the left arm.  There is associated paresthesias of the arm extending to the hand.  He also notes significant muscle atrophy of the hand and subjective strength loss.  Patient has been through PT with some benefit.  Of note patient also complains of chronic low back pain with recent exacerbation while in physical therapy.  This is actually more of a concern for him at today's visit than his cervical spine.  Pain remains focal to the lumbar spine without significant radiation to the buttock or lower extremities.  Patient is scheduled for a MRI of his lumbar spine this week.  Injections: No    Pertinent Surgical History: N/A   Imagin) MRI lumbar Spine (1/15/2025) - [facility]  Physician Disclaimer: I have personally reviewed and confirmed all HPI data with the patient.  [] : Post Surgical Visit: no [FreeTextEntry6] : numbness [FreeTextEntry7] : rt leg, left arm/ fingers [FreeTextEntry9] : exercise [de-identified] : Activity  [de-identified] : C MRI AT [facility]

## 2025-05-19 NOTE — PHYSICAL EXAM
[de-identified] : Constitutional:   - No acute distress   - Well developed; well nourished    Neurological:   - normal mood and affect   - alert and oriented x 3     Cardiovascular:   - grossly normal   Cervical Spine Exam:   Inspection:   erythema (-)   ecchymosis (-)   rashes (-)    Palpation:                                                    Cervical paraspinal tenderness:         R (-); L (-)  Upper trapezius tenderness:              R (-); L (+)  Rhomboids tenderness:                      R (-); L (+)  Occipital Ridge:                                    R (-); L (-)  Supraspinatus tenderness:                 R (-); L (-)   ROM: Reduced ROM all planes  Strength Testing:              Deltoid                           R (5/5); L (5/5)  Biceps:                          R (5/5); L (5/5)  Triceps:                         R (5/5); L (5/5)  Finger Abductors:         R (5/5); L (5/5)  Grasp:                           R (5/5); L (5/5)   Special Testing:  Spurling Test:                  R (-); L (=)  Facet load test:               R (-); L (-)   Neuro:  SILT throughout right upper extremity  SILT throughout left upper extremity   Reflexes:  Biceps   -           R (2+); L (2+)  Triceps  -           R (2+); L (2+)  Brachioradialis- R (2+); L (2+)     No ankle clonus  Some difficulty with tandem gait  Lumbar Spine Exam:   Inspection: erythema (-) ecchymosis (-) rashes (-) alignment: no scoliosis   Palpation: Midline lumbar tenderness:            (-) midline thoracic tenderness:          (-) Lumbar paraspinal tenderness:      L (+); R (+) thoracic paraspinal tenderness:     L (-); R (-) sciatic nerve tenderness :              L (-); R (-) SI joint tenderness:                        L (-); R (-) GTB tenderness:                            L (-); R (-)   ROM: reduced all planes pain with extremes of extension=flexion   Strength:                                    Right       Left  Hip Flexion:                (5/5)       (5/5) Quadriceps:               (5/5)       (5/5) Hamstrings:                (5/5)       (5/5) Ankle Dorsiflexion:     (5/5)       (5/5) EHL:                           (5/5)       (5/5) Ankle Plantarflexion:  (5/5)       (5/5)   Special Tests: SLR:                           R (=) ; L (=) Facet loading:            R (+) ; L (+) EUNICE test:               R (n/a) ; L (n/a) Hamstring tightness:  R (+);  L (+)   Neurologic: SI LT throughout right lower extremity SI LT throughout left lower extremity   Reflexes normal and symmetric bilateral lower extremities   Gait: non- antalgic gait ambulates without assistive device

## 2025-05-19 NOTE — ASSESSMENT
[FreeTextEntry1] : A discussion regarding available pain management treatment options occurred with the patient.  These included interventional, rehabilitative, pharmacological, and alternative modalities. We will proceed with the following:    Interventional treatment options:   - Proceed with bilateral L4-L5, 5-S1 facet joint MBB with fluoroscopic guidance - Proceed to RFA if adequate relief with diagnostic intervention - Patient is candidate for L3-S1 BVN ablation (Intracept procedure) for vertebrogenic pain component; informational materials provided - see additional instructions below    Rehabilitative options: - Completed prior physical therapy trials - participation in active HEP was discussed and encouraged as tolerated  Medication based treatment options:   - Continue naproxen 500 mg up to BID as needed - continue methocarbamol 750 mg up to BID as needed for spasm - see additional instructions below    Complementary treatment options:   - Weight management and lifestyle modifications discussed   Additional treatment recommendations as follows:   -Patient will follow up with Dr. Enciso as directed - Follow up 1-2 weeks post injection for assessment of efficacy and further treatment recommendations  The risks, benefits and alternatives of the proposed procedure were explained in detail with the patient. The risks outlined include but are not limited to infection, bleeding, post- dural puncture headache, nerve injury, a temporary increase in pain, failure to resolve symptoms, need for future interventions, allergic reaction, and possible elevation of blood sugar in diabetics if using corticosteroid.  All questions were answered to patient's apparent satisfaction, and he/she verbalized an understanding.  Patient presents with axial lumbar pain that has not responded to 3 months of conservative therapy including physical therapy or NSAID therapy.  The pain is interfering with activities of daily living and functionality.  There is no radicular pain.  The pain is exacerbated by facet loading.  Positive Kemps maneuver which is defined by pain reproduction with extension and rotation of the lumbar spine to the affected side.  The patient has not had a vertebral fusion at the levels of the proposed treatment.  There is no unexplained neurologic deficit.  There is no history of systemic infection, unstable medical condition, bleeding tendency, or local infection.  The injection is being performed to diagnose the facet joint as the source of the individual's pain, in preparation for a radiofrequency ablation.  We have discussed the risks, benefits, and alternatives NSAID therapy including but not limited to the risk of bleeding, thrombosis, gastric mucosal irritation/ulceration, allergic reaction and kidney dysfunction; the patient verbalizes an understanding.  The documentation recorded by the scribe, in my presence, accurately reflects the service I personally performed and the decisions made by me with my edits as appropriate.   I, Gina Moreno, acting as scribe, attest that this documentation has been prepared under the direction and in the presence of Provider Ubaldo Monique DO.

## 2025-06-09 NOTE — HISTORY OF PRESENT ILLNESS
[Lower back] : lower back [6] : 6 [4] : 4 [Burning] : burning [Radiating] : radiating [Sharp] : sharp [Constant] : constant [Household chores] : household chores [Leisure] : leisure [Sleep] : sleep [Work] : work [Bending forward] : bending forward [FreeTextEntry1] : 2025 - Patient presents for 3-week FUV. Patient reports today with several questions regarding upcoming bilateral L4-L5, L5-S1 facet joint MBB on 2025. Patient reports pain remains stable and unchanged since last office visit.  Continues to report pain across the low back with activities that involve both forward flexion and extension.  Denies any bothersome radicular symptoms at this time. Denies any alarm signs or changes in medical history since last visit.   2025 - Patient presents for reevaluation; last seen about 2 years ago.  Patient reports he continues to have low back pain which he feels is limiting his ability to participate in day-to-day activities. His pain is across his low back, with occasional radiation to his right leg. His back pain is his predominant concern at this time. He has done formal PT for his low back and continues HEP without meaningful benefit. He also reports he is getting worsening numbness/tingling in his left upper extremity, down to his hand.  2023 - The patient presents for initial evaluation regarding their neck pain.  Patient was referred by Dr. Mays.  Patient with history of chronic neck pain with recent exacerbation of the previous 2 months.  Denies any particular exacerbating and or inciting events.  His current pain is in the neck with radicular pain to the left arm.  There is associated paresthesias of the arm extending to the hand.  He also notes significant muscle atrophy of the hand and subjective strength loss.  Patient has been through PT with some benefit.  Of note patient also complains of chronic low back pain with recent exacerbation while in physical therapy.  This is actually more of a concern for him at today's visit than his cervical spine.  Pain remains focal to the lumbar spine without significant radiation to the buttock or lower extremities.  Patient is scheduled for a MRI of his lumbar spine this week.  Injections: No    Pertinent Surgical History: N/A   Imagin) MRI lumbar Spine (1/15/2025) - [facility]  Physician Disclaimer: I have personally reviewed and confirmed all HPI data with the patient. [] : Post Surgical Visit: no [FreeTextEntry9] : Exercise, stretching  [FreeTextEntry7] : right leg, rt knee  [de-identified] : working  [de-identified] : MRI lumbar Spine (1/15/2025) - [facility]

## 2025-06-09 NOTE — REVIEW OF SYSTEMS
[Joint Pain] : joint pain [Joint Swelling] : joint swelling [Joint Stiffness] : joint stiffness [Feeling Tired] : fatigue [Urinary Frequency] : urinary frequency [Urinary Urgency] : urinary urgency [Feeling Weak] : feeling weak [Muscle Weakness] : muscle weakness [Negative] : Heme/Lymph

## 2025-06-09 NOTE — PHYSICAL EXAM
[de-identified] : Constitutional:   - No acute distress   - Well developed; well nourished    Neurological:   - normal mood and affect   - alert and oriented x 3     Cardiovascular:   - grossly normal   Cervical Spine Exam:   Inspection:   erythema (-)   ecchymosis (-)   rashes (-)    Palpation:                                                    Cervical paraspinal tenderness:         R (-); L (-)  Upper trapezius tenderness:              R (-); L (+)  Rhomboids tenderness:                      R (-); L (+)  Occipital Ridge:                                    R (-); L (-)  Supraspinatus tenderness:                 R (-); L (-)   ROM: Reduced ROM all planes  Strength Testing:              Deltoid                           R (5/5); L (5/5)  Biceps:                          R (5/5); L (5/5)  Triceps:                         R (5/5); L (5/5)  Finger Abductors:         R (5/5); L (5/5)  Grasp:                           R (5/5); L (5/5)   Special Testing:  Spurling Test:                  R (-); L (=)  Facet load test:               R (-); L (-)   Neuro:  SILT throughout right upper extremity  SILT throughout left upper extremity   Reflexes:  Biceps   -           R (2+); L (2+)  Triceps  -           R (2+); L (2+)  Brachioradialis- R (2+); L (2+)     No ankle clonus  Some difficulty with tandem gait  Lumbar Spine Exam:   Inspection: erythema (-) ecchymosis (-) rashes (-) alignment: no scoliosis   Palpation: Midline lumbar tenderness:            (-) midline thoracic tenderness:          (-) Lumbar paraspinal tenderness:      L (+); R (+) thoracic paraspinal tenderness:     L (-); R (-) sciatic nerve tenderness :              L (-); R (-) SI joint tenderness:                        L (-); R (-) GTB tenderness:                            L (-); R (-)   ROM: reduced all planes pain with extremes of extension=flexion   Strength:                                    Right       Left  Hip Flexion:                (5/5)       (5/5) Quadriceps:               (5/5)       (5/5) Hamstrings:                (5/5)       (5/5) Ankle Dorsiflexion:     (5/5)       (5/5) EHL:                           (5/5)       (5/5) Ankle Plantarflexion:  (5/5)       (5/5)   Special Tests: SLR:                           R (=) ; L (=) Facet loading:            R (+) ; L (+) EUNICE test:               R (n/a) ; L (n/a) Hamstring tightness:  R (+);  L (+)   Neurologic: SI LT throughout right lower extremity SI LT throughout left lower extremity   Reflexes normal and symmetric bilateral lower extremities   Gait: non- antalgic gait ambulates without assistive device

## 2025-06-09 NOTE — DATA REVIEWED
[Cervical Spine] : cervical spine [Lumbar Spine] : lumbar spine [Report was reviewed and noted in the chart] : The report was reviewed and noted in the chart [MRI] : MRI [I reviewed the films/CD] : I reviewed the films/CD [I independently reviewed and interpreted images and report] : I independently reviewed and interpreted images and report

## 2025-06-09 NOTE — ASSESSMENT
[FreeTextEntry1] : A discussion regarding available pain management treatment options occurred with the patient.  These included interventional, rehabilitative, pharmacological, and alternative modalities. We will proceed with the following:    Interventional treatment options:   - Proceed with bilateral L4-L5, 5-S1 facet joint MBB with fluoroscopic guidance - Proceed to RFA if adequate relief with diagnostic intervention - Patient is candidate for L3-S1 BVN ablation (Intracept procedure) for vertebrogenic pain component; informational materials provided and discussed  - see additional instructions below    Rehabilitative options: - Completed prior physical therapy trials - participation in active HEP was discussed and encouraged as tolerated  Medication based treatment options:   - Continue naproxen 500 mg up to BID as needed - continue methocarbamol 750 mg up to BID as needed for spasm - see additional instructions below    Complementary treatment options:   - Weight management and lifestyle modifications discussed   Additional treatment recommendations as follows:   - Patient will follow up with Dr. Mays as directed - Follow up 1-2 weeks post injection for assessment of efficacy and further treatment recommendations  The risks, benefits and alternatives of the proposed procedure were explained in detail with the patient. The risks outlined include but are not limited to infection, bleeding, post- dural puncture headache, nerve injury, a temporary increase in pain, failure to resolve symptoms, need for future interventions, allergic reaction, and possible elevation of blood sugar in diabetics if using corticosteroid.  All questions were answered to patient's apparent satisfaction, and he/she verbalized an understanding.  Patient presents with axial lumbar pain that has not responded to 3 months of conservative therapy including physical therapy or NSAID therapy.  The pain is interfering with activities of daily living and functionality.  There is no radicular pain.  The pain is exacerbated by facet loading.  Positive Kemps maneuver which is defined by pain reproduction with extension and rotation of the lumbar spine to the affected side.  The patient has not had a vertebral fusion at the levels of the proposed treatment.  There is no unexplained neurologic deficit.  There is no history of systemic infection, unstable medical condition, bleeding tendency, or local infection.  The injection is being performed to diagnose the facet joint as the source of the individual's pain, in preparation for a radiofrequency ablation.  We have discussed the risks, benefits, and alternatives NSAID therapy including but not limited to the risk of bleeding, thrombosis, gastric mucosal irritation/ulceration, allergic reaction and kidney dysfunction; the patient verbalizes an understanding.  The documentation recorded by the scribe, in my presence, accurately reflects the service I personally performed and the decisions made by me with my edits as appropriate.   I, Taylor Casey NP, acting as scribe, attest that this documentation has been prepared under the direction and in the presence of Provider Ubaldo Monique DO.

## 2025-06-11 NOTE — PROCEDURE
[FreeTextEntry3] : Date of Service: 06/11/2025   Account: [id number]  Patient: SELIN FREEMAN   YOB: 1961  Age: 63 year  Surgeon:                  Ubaldo Monique DO  Assistant:                None  Pre-Operative Diagnosis:   Lumbar Spondylosis      Post Operative Diagnosis:  Lumbar Spondylosis  Procedure:             Bilateral L4-5, L5-S1 facet block under fluoroscopic guidance.  Anesthesia:            MAC  This procedure was carried out using fluoroscopic guidance.  The risks and benefits of the procedure were discussed extensively with the patient.  The consent of the patient was obtained and the following procedure was performed.  A timeout was performed with all essential staff present and the site and side were verified.  The lumbar vertebral bodies were identified and the fluoroscope was obliqued ipsilateral to approximately 30 degrees to reveal the appropriate anatomical view.  The junction of the superior articulate process and transverse process at the right L4 and L5 levels were identified and marked.   The skin at these target points was then localized using 1 cc of 1% Lidocaine at each injection site.  A spinal needle was then introduced and advanced to the above target points at the junction of the SAP and transverse processes until bone was contacted.  Fluoroscope then focused on the right sacral ala on A/P view and marked at this point.  The skin and subcutaneous structures were localized using 1cc of 1.0 % lidocaine.  A spinal needle was then advanced under fluoroscopic guidance until bone was contacted at the ala.    After negative aspiration for heme and CSF 1 cc of 0.5% Marcaine was injected at each of the injection sites.  The procedure was performed in the exact same fashion on the contralateral left side at the L4, L5 and sacral ala levels.  Vital signs remained normal throughout the procedure.  The patient tolerated the procedure well.  There were no immediate complications from the performed procedure.  The patient was instructed to apply ice over the injection sites for twenty minutes every two hours for the next 24 hours.  Disposition:      1. The patient was advised to F/U in 1-2 weeks to assess the response to the injection.       2. They were advised to keep a pain diary to report the results of the diagnostic block at their FUV.      3. The patient was also instructed to contact me immediately if there were any concerns related to the procedure performed.

## 2025-06-13 NOTE — PHYSICAL EXAM
[de-identified] : Constitutional:   - No acute distress   - Well developed; well nourished    Neurological:   - normal mood and affect   - alert and oriented x 3     Cardiovascular:   - grossly normal   Cervical Spine Exam:   Inspection:   erythema (-)   ecchymosis (-)   rashes (-)    Palpation:                                                    Cervical paraspinal tenderness:         R (-); L (-)  Upper trapezius tenderness:              R (-); L (+)  Rhomboids tenderness:                      R (-); L (+)  Occipital Ridge:                                    R (-); L (-)  Supraspinatus tenderness:                 R (-); L (-)   ROM: Reduced ROM all planes  Strength Testing:              Deltoid                           R (5/5); L (5/5)  Biceps:                          R (5/5); L (5/5)  Triceps:                         R (5/5); L (5/5)  Finger Abductors:         R (5/5); L (5/5)  Grasp:                           R (5/5); L (5/5)   Special Testing:  Spurling Test:                  R (-); L (=)  Facet load test:               R (-); L (-)   Neuro:  SILT throughout right upper extremity  SILT throughout left upper extremity   Reflexes:  Biceps   -           R (2+); L (2+)  Triceps  -           R (2+); L (2+)  Brachioradialis- R (2+); L (2+)     No ankle clonus  Some difficulty with tandem gait  Lumbar Spine Exam:   Inspection: erythema (-) ecchymosis (-) rashes (-) alignment: no scoliosis   Palpation: Midline lumbar tenderness:            (-) midline thoracic tenderness:          (-) Lumbar paraspinal tenderness:      L (+); R (+) thoracic paraspinal tenderness:     L (-); R (-) sciatic nerve tenderness :              L (-); R (-) SI joint tenderness:                        L (-); R (-) GTB tenderness:                            L (-); R (-)   ROM: reduced all planes pain with extremes of extension=flexion   Strength:                                    Right       Left  Hip Flexion:                (5/5)       (5/5) Quadriceps:               (5/5)       (5/5) Hamstrings:                (5/5)       (5/5) Ankle Dorsiflexion:     (5/5)       (5/5) EHL:                           (5/5)       (5/5) Ankle Plantarflexion:  (5/5)       (5/5)   Special Tests: SLR:                           R (=) ; L (=) Facet loading:            R (+) ; L (+) EUNICE test:               R (n/a) ; L (n/a) Hamstring tightness:  R (+);  L (+)   Neurologic: SI LT throughout right lower extremity SI LT throughout left lower extremity   Reflexes normal and symmetric bilateral lower extremities   Gait: non- antalgic gait ambulates without assistive device

## 2025-06-13 NOTE — HISTORY OF PRESENT ILLNESS
[FreeTextEntry1] : 2025 - Patient presents for FUV after a bilateral L4-5, L5-S1 facet joint MBB on 2025. Patient reports  2025 - Patient presents for 3-week FUV. Patient reports today with several questions regarding upcoming bilateral L4-L5, L5-S1 facet joint MBB on 2025. Patient reports pain remains stable and unchanged since last office visit.  Continues to report pain across the low back with activities that involve both forward flexion and extension.  Denies any bothersome radicular symptoms at this time. Denies any alarm signs or changes in medical history since last visit.   2025 - Patient presents for reevaluation; last seen about 2 years ago.  Patient reports he continues to have low back pain which he feels is limiting his ability to participate in day-to-day activities. His pain is across his low back, with occasional radiation to his right leg. His back pain is his predominant concern at this time. He has done formal PT for his low back and continues HEP without meaningful benefit. He also reports he is getting worsening numbness/tingling in his left upper extremity, down to his hand.  2023 - The patient presents for initial evaluation regarding their neck pain.  Patient was referred by Dr. Mays.  Patient with history of chronic neck pain with recent exacerbation of the previous 2 months.  Denies any particular exacerbating and or inciting events.  His current pain is in the neck with radicular pain to the left arm.  There is associated paresthesias of the arm extending to the hand.  He also notes significant muscle atrophy of the hand and subjective strength loss.  Patient has been through PT with some benefit.  Of note patient also complains of chronic low back pain with recent exacerbation while in physical therapy.  This is actually more of a concern for him at today's visit than his cervical spine.  Pain remains focal to the lumbar spine without significant radiation to the buttock or lower extremities.  Patient is scheduled for a MRI of his lumbar spine this week.  Injections:  1) Bilateral L4-5, L5-S1 facet joint MBB (2025)  Pertinent Surgical History: N/A   Imagin) MRI lumbar Spine (1/15/2025) - [facility]  Physician Disclaimer: I have personally reviewed and confirmed all HPI data with the patient.

## 2025-06-17 NOTE — DATA REVIEWED
[FreeTextEntry2] : in office x-rays Lumbar spine ap/lat 05/16/2025 demonstrates anterior osteophytes close to Autofusion L4/5 L5S1 w persistent multi-level DDD. No change since prior XR [FreeTextEntry3] : in office x-rays b/l hips ap/lat 06/19/2024 demonstrates reasonable preservation of hip joint cartilage  [FreeTextEntry1] : MRI Lumbar Spine from [place] Multilevel chronic DDD, no central stenosis no nerve root compression,  Moderate bilateral foraminal stenosis L4/L5. Modic changes L4-5, L2-3, L5S1  I stop paperwork reviewed PT progress notes reviewed

## 2025-06-17 NOTE — DISCUSSION/SUMMARY
[de-identified] : I discussed with the patient at length there is likely a component of both shoulder and cervical pathology contributing to symptom complex Referred for cervical spine MRI to evaluate for hnp vs stenosis, patient has ongoing left upper extremity pain for 6 weeks despite exercise-based rehabilitation & NSAID usage and triceps weakness(3/5)  Referred for left shoulder MRI to evaluate for RCT, SLAP tear patient has ongoing left upper extremity pain for 6 weeks despite exercise-based rehabilitation & NSAID usage. and weakness in Abduction and internal rotation(3/5) and impingement sign and apprehension sign. Patient will continue to follow up with Dr. Monique for Lumbar MBB/RFA procedures.  Discussed with patient that surgical intervention is an option, but we will try conservative treatment first, given that surgery would likely require a four level lumbar decompression and fusion with extended recovery time and may not meet patient expectations with respect to symptom relief. Continue Lumbar spine/core strengthening exercise program.  Referred to shoulder specialist   Follow up: after cervical mri   Prior to appointment and during encounter with patient extensive medical records were reviewed including but not limited to, hospital records, outpatient records, imaging results, and lab data. During this appointment the patient was examined, diagnoses were discussed and explained in a face to face manner. In addition, extensive time was spent reviewing aforementioned diagnostic studies. Counseling including abnormal image results, differential diagnoses, treatment options, risk and benefits, lifestyle changes, current condition, and current medications was performed. Patient's comments, questions, and concerns were addressed and patient verbalized understanding. Based on this patient's presentation at our office, which is an orthopedic spine surgeon's office, this patient inherently / intrinsically has a risk, however minute, of developing issues such as Cauda equina syndrome, bowel and bladder changes, or progression of motor or neurological deficits such as paralysis which may be permanent.   KELLY ALAN Acting as a Scribe for Dr. Davon LEGER, Kelly Alan, attest that this documentation has been prepared under the direction and in the presence of Provider Basim Mays MD.

## 2025-06-17 NOTE — DATA REVIEWED
[FreeTextEntry2] : in office x-rays Lumbar spine ap/lat 05/16/2025 demonstrates anterior osteophytes close to Autofusion L4/5 L5S1 w persistent multi-level DDD. No change since prior XR [FreeTextEntry3] : in office x-rays b/l hips ap/lat 06/19/2024 demonstrates reasonable preservation of hip joint cartilage  [FreeTextEntry1] : MRI Lumbar Spine from [place] Multilevel chronic DDD, no central stenosis no nerve root compression,  Moderate bilateral foraminal stenosis L4/L5. Modic changes L4-5, L2-3, L5S1  I stop paperwork reviewed PT progress notes reviewed

## 2025-06-17 NOTE — DISCUSSION/SUMMARY
[de-identified] : I discussed with the patient at length there is likely a component of both shoulder and cervical pathology contributing to symptom complex Referred for cervical spine MRI to evaluate for hnp vs stenosis, patient has ongoing left upper extremity pain for 6 weeks despite exercise-based rehabilitation & NSAID usage and triceps weakness(3/5)  Referred for left shoulder MRI to evaluate for RCT, SLAP tear patient has ongoing left upper extremity pain for 6 weeks despite exercise-based rehabilitation & NSAID usage. and weakness in Abduction and internal rotation(3/5) and impingement sign and apprehension sign. Patient will continue to follow up with Dr. Monique for Lumbar MBB/RFA procedures.  Discussed with patient that surgical intervention is an option, but we will try conservative treatment first, given that surgery would likely require a four level lumbar decompression and fusion with extended recovery time and may not meet patient expectations with respect to symptom relief. Continue Lumbar spine/core strengthening exercise program.  Referred to shoulder specialist   Follow up: after cervical mri   Prior to appointment and during encounter with patient extensive medical records were reviewed including but not limited to, hospital records, outpatient records, imaging results, and lab data. During this appointment the patient was examined, diagnoses were discussed and explained in a face to face manner. In addition, extensive time was spent reviewing aforementioned diagnostic studies. Counseling including abnormal image results, differential diagnoses, treatment options, risk and benefits, lifestyle changes, current condition, and current medications was performed. Patient's comments, questions, and concerns were addressed and patient verbalized understanding. Based on this patient's presentation at our office, which is an orthopedic spine surgeon's office, this patient inherently / intrinsically has a risk, however minute, of developing issues such as Cauda equina syndrome, bowel and bladder changes, or progression of motor or neurological deficits such as paralysis which may be permanent.   KELLY ALAN Acting as a Scribe for Dr. Davon LEGER, Kelly Alan, attest that this documentation has been prepared under the direction and in the presence of Provider Basim Mays MD.

## 2025-06-17 NOTE — PHYSICAL EXAM
[3___] : internal rotation 3[unfilled]/5 [] : positive shoulder apprehension [FreeTextEntry8] : Biceps tenderness

## 2025-06-17 NOTE — HISTORY OF PRESENT ILLNESS
[de-identified] : 06/16/2025 - Patient returns for follow up visit. Reports increase in left shoulder and arm pain since his fall in early May. Following up with Dr. Monique for lumbar MBBs/RFA. Has tried to resume his physical activities in the gym but having difficulty due to left arm pain. Difficulty bench pressing. He also reports tremors in left hand returned for one month. He has no history of prior left shoulder subluxations.   05/16/2025 - Patient presenting for follow up visit. He reports improvement in atrophy and weakness in his LUE. Completed 3-5 months of PT and now continues active exercise based rehabilitation. About 2 wks ago he reports he fell which he feels exacerbated his LUE symptoms. Complains of radiating pain from left elbow into his first 3 digits, which he is able to trigger with cervical rom. He does not feel his strength has worsened. Treated with ibuprofen for 1.5 wk, stopped 3-4 days ago. Of note reports increase in LUE numbness starting 4 wks ago with increase in physical activities. Also complains increase in baseline level of pain in the low back.   01/22/2025: Patient presents for follow up visit and review of MRI.  Patient reports when he does home exercises he does feel relief. He states his right sided leg pain is beginning to resolve slowly. He does have pressure in his lower back. Patient does not take anything for pain. He does occasionally ice and heat.   01/03/2025: Patient is here today for a follow up visit. Patient reports right side L4-L5 has worsened. Reports resolving atrophy in regards to cervical spine. Still minor numbness, tingling in left arm. Current issue is worsened pain in lower back near tailbone. He is experiencing right sided leg pain that radiates to hip, knee.  06/19/2024: patient here for follow up visit. Reports 75-90% improvement in strength. Experiences intermittent numbness in the left arm and first 3 digits of hand. Reports he lost 21 pounds through exercising. Neck pain has improved, but still reports pain in lower back. Found that the more he exercises, the more he experiences pain in hips and low back. Left side - above buttock gets stiff; right side- sciatic radiates down leg to knee. Wants to continue to exercise and be active. Pain level normally is 1-2/10, but increases with exercise. Repetitive bending causes fatigue of lower lumbar. He reports a need for more frequent stretching to keep pain at bay. Heat started making pain worse 6 months ago. Reports minor loss of strength in right leg.   08/09/2023 - Patient presenting for a FUV. Chief complaint c/t to be pain more prevalent in the left-hand and diminished  strength. States pins/needles and numbness in the left forearm region, intermittent. Wore left UE brace for 6 wks. Underwent MMRI of the left upper extremity. Follows up with Dr. Cronin, recommended to now use brace at nighttime. No neck pain at this time.   6/14/2023 - Patient presenting for a follow up and MRI Review of L spine. He reports sharp pain on the hip bone present at his last visit has significant reduced. He complains of baseline stiffness focal to the RT low back, currently controlled. Denies any frequent sciatic like symptoms.   6/2/2023 - 62 y/o M presenting for an evaluation of C, T, and L spine. Patient states he fell out of bed - this fall resulted in central low back pain radiating into left paraspinal (7/10 pain level in office today). Also c/o sharp deep left hip pain on the bone. He reports cervical neck pain subsided after PT. However, he continues to have numbness / loss of function in the left am. Weakness remains present in the left hand. Atrophy in the left hand over the last few weeks. Left hand is his dominant hand. No right upper extremity symptoms. He has discontinued PT for the past 1.5 wk since his newer lumbar issues. Taking NSAID and completing PT and pains are breaking through.   5/19/23: Pt presents to office for follow up of C and T spine. Improvements since he was last seen. No more pain in the neck, left arm, and upper back. C/o intermittent tingling in the left upper extremity, worse at nighttime and lying down. Experiencing left hip pain after massage at acupuncture. Left upper extremity strength is gradually improving day by day. PT is helpful.   4/14/23 Pt presents to office for follow up of C and T spine. He is here today to review MRI results from [facility]. He continues to report pain which affects his ADLs. The pain is mostly on the left side. He use to weight lift and run which he stopped doing. Previous XR of thoracic and cervical spine reveals arthritis. No right sided upper extremity symptoms. He is continuing PT and taking medications as prescribed.   4/7/23: 62 y/o M presenting for an initial evaluation of T & C Spine. Chief complaint is left sided neck pain radiating into the shoulder blade/throughout left arm starting 14 days ago. No trauma. States pain started after sleeping. Pain is worse while lying down.Patient reports subjective weakness in the LT UE (50 % deficit). Patient received EKG, abnormalities were rules out. No right sided upper extremity symptoms. Pt was rxd MDP with no relief, and is currently taking gabapentin 300 mg and naproxen. Prescribed muscle relaxer provided minimal relief for approx 1 hr, he has d/c. Pmhx of DDD of the lumbar spine. Completing cervical HEP. Has not started PT due to severity of his pains.      [de-identified] : no

## 2025-06-17 NOTE — HISTORY OF PRESENT ILLNESS
[de-identified] : 06/16/2025 - Patient returns for follow up visit. Reports increase in left shoulder and arm pain since his fall in early May. Following up with Dr. Monique for lumbar MBBs/RFA. Has tried to resume his physical activities in the gym but having difficulty due to left arm pain. Difficulty bench pressing. He also reports tremors in left hand returned for one month. He has no history of prior left shoulder subluxations.   05/16/2025 - Patient presenting for follow up visit. He reports improvement in atrophy and weakness in his LUE. Completed 3-5 months of PT and now continues active exercise based rehabilitation. About 2 wks ago he reports he fell which he feels exacerbated his LUE symptoms. Complains of radiating pain from left elbow into his first 3 digits, which he is able to trigger with cervical rom. He does not feel his strength has worsened. Treated with ibuprofen for 1.5 wk, stopped 3-4 days ago. Of note reports increase in LUE numbness starting 4 wks ago with increase in physical activities. Also complains increase in baseline level of pain in the low back.   01/22/2025: Patient presents for follow up visit and review of MRI.  Patient reports when he does home exercises he does feel relief. He states his right sided leg pain is beginning to resolve slowly. He does have pressure in his lower back. Patient does not take anything for pain. He does occasionally ice and heat.   01/03/2025: Patient is here today for a follow up visit. Patient reports right side L4-L5 has worsened. Reports resolving atrophy in regards to cervical spine. Still minor numbness, tingling in left arm. Current issue is worsened pain in lower back near tailbone. He is experiencing right sided leg pain that radiates to hip, knee.  06/19/2024: patient here for follow up visit. Reports 75-90% improvement in strength. Experiences intermittent numbness in the left arm and first 3 digits of hand. Reports he lost 21 pounds through exercising. Neck pain has improved, but still reports pain in lower back. Found that the more he exercises, the more he experiences pain in hips and low back. Left side - above buttock gets stiff; right side- sciatic radiates down leg to knee. Wants to continue to exercise and be active. Pain level normally is 1-2/10, but increases with exercise. Repetitive bending causes fatigue of lower lumbar. He reports a need for more frequent stretching to keep pain at bay. Heat started making pain worse 6 months ago. Reports minor loss of strength in right leg.   08/09/2023 - Patient presenting for a FUV. Chief complaint c/t to be pain more prevalent in the left-hand and diminished  strength. States pins/needles and numbness in the left forearm region, intermittent. Wore left UE brace for 6 wks. Underwent MMRI of the left upper extremity. Follows up with Dr. Cronin, recommended to now use brace at nighttime. No neck pain at this time.   6/14/2023 - Patient presenting for a follow up and MRI Review of L spine. He reports sharp pain on the hip bone present at his last visit has significant reduced. He complains of baseline stiffness focal to the RT low back, currently controlled. Denies any frequent sciatic like symptoms.   6/2/2023 - 60 y/o M presenting for an evaluation of C, T, and L spine. Patient states he fell out of bed - this fall resulted in central low back pain radiating into left paraspinal (7/10 pain level in office today). Also c/o sharp deep left hip pain on the bone. He reports cervical neck pain subsided after PT. However, he continues to have numbness / loss of function in the left am. Weakness remains present in the left hand. Atrophy in the left hand over the last few weeks. Left hand is his dominant hand. No right upper extremity symptoms. He has discontinued PT for the past 1.5 wk since his newer lumbar issues. Taking NSAID and completing PT and pains are breaking through.   5/19/23: Pt presents to office for follow up of C and T spine. Improvements since he was last seen. No more pain in the neck, left arm, and upper back. C/o intermittent tingling in the left upper extremity, worse at nighttime and lying down. Experiencing left hip pain after massage at acupuncture. Left upper extremity strength is gradually improving day by day. PT is helpful.   4/14/23 Pt presents to office for follow up of C and T spine. He is here today to review MRI results from [facility]. He continues to report pain which affects his ADLs. The pain is mostly on the left side. He use to weight lift and run which he stopped doing. Previous XR of thoracic and cervical spine reveals arthritis. No right sided upper extremity symptoms. He is continuing PT and taking medications as prescribed.   4/7/23: 60 y/o M presenting for an initial evaluation of T & C Spine. Chief complaint is left sided neck pain radiating into the shoulder blade/throughout left arm starting 14 days ago. No trauma. States pain started after sleeping. Pain is worse while lying down.Patient reports subjective weakness in the LT UE (50 % deficit). Patient received EKG, abnormalities were rules out. No right sided upper extremity symptoms. Pt was rxd MDP with no relief, and is currently taking gabapentin 300 mg and naproxen. Prescribed muscle relaxer provided minimal relief for approx 1 hr, he has d/c. Pmhx of DDD of the lumbar spine. Completing cervical HEP. Has not started PT due to severity of his pains.      [de-identified] : no

## 2025-06-19 NOTE — ASSESSMENT
[FreeTextEntry1] : A discussion regarding available pain management treatment options occurred with the patient.  These included interventional, rehabilitative, pharmacological, and alternative modalities. We will proceed with the following:    Interventional treatment options:   - Proceed with repeat bilateral L4-L5, 5-S1 facet joint MBB with fluoroscopic guidance - Proceed to RFA if adequate relief with diagnostic intervention - Patient is candidate for L3-S1 BVN ablation (Intracept procedure) for vertebrogenic pain component; informational materials provided and discussed  - see additional instructions below    Rehabilitative options: - Completed prior physical therapy trials; consider repeat once adequate relief - participation in active HEP was discussed and encouraged as tolerated  Medication based treatment options:   - Continue naproxen 500 mg up to BID as needed - continue methocarbamol 750 mg up to BID as needed for spasm - see additional instructions below    Complementary treatment options:   - Weight management and lifestyle modifications discussed   Additional treatment recommendations as follows:   - Patient will follow up with Dr. Mays as directed - Follow up 1-2 weeks post injection for assessment of efficacy and further treatment recommendations  The risks, benefits and alternatives of the proposed procedure were explained in detail with the patient. The risks outlined include but are not limited to infection, bleeding, post- dural puncture headache, nerve injury, a temporary increase in pain, failure to resolve symptoms, need for future interventions, allergic reaction, and possible elevation of blood sugar in diabetics if using corticosteroid.  All questions were answered to patient's apparent satisfaction, and he/she verbalized an understanding.  Patient presents with axial lumbar pain that has not responded to 3 months of conservative therapy including physical therapy or NSAID therapy.  The pain is interfering with activities of daily living and functionality.  There is no radicular pain.  The pain is exacerbated by facet loading.  Positive Kemps maneuver which is defined by pain reproduction with extension and rotation of the lumbar spine to the affected side.  The patient has not had a vertebral fusion at the levels of the proposed treatment.  There is no unexplained neurologic deficit.  There is no history of systemic infection, unstable medical condition, bleeding tendency, or local infection.  The injection is being performed to diagnose the facet joint as the source of the individual's pain, in preparation for a radiofrequency ablation.  We have discussed the risks, benefits, and alternatives NSAID therapy including but not limited to the risk of bleeding, thrombosis, gastric mucosal irritation/ulceration, allergic reaction and kidney dysfunction; the patient verbalizes an understanding.

## 2025-06-19 NOTE — HISTORY OF PRESENT ILLNESS
[Lower back] : lower back [4] : 4 [Burning] : burning [Radiating] : radiating [Sharp] : sharp [Constant] : constant [Household chores] : household chores [Leisure] : leisure [Work] : work [Sleep] : sleep [Bending forward] : bending forward [6] : 6 [Injection therapy] : injection therapy [FreeTextEntry1] : 2025 - Patient presents today for a FUV after bilateral L4-L5, L5-S1 facet joint MBB on 2025. Patient reports 80-70% reduction in axial low back pain the day of the procedure ad a gradual return to baseline pain by the following day. The patient had a positive response to the diagnostic nerve block and wishes to proceed with a repeat at this time.  Denies any alarm signs or changes in medical history since last office visit.    2025 - Patient presents for 3-week FUV. Patient reports today with several questions regarding upcoming bilateral L4-L5, L5-S1 facet joint MBB on 2025. Patient reports pain remains stable and unchanged since last office visit.  Continues to report pain across the low back with activities that involve both forward flexion and extension.  Denies any bothersome radicular symptoms at this time. Denies any alarm signs or changes in medical history since last visit.   2025 - Patient presents for reevaluation; last seen about 2 years ago.  Patient reports he continues to have low back pain which he feels is limiting his ability to participate in day-to-day activities. His pain is across his low back, with occasional radiation to his right leg. His back pain is his predominant concern at this time. He has done formal PT for his low back and continues HEP without meaningful benefit. He also reports he is getting worsening numbness/tingling in his left upper extremity, down to his hand.  2023 - The patient presents for initial evaluation regarding their neck pain.  Patient was referred by Dr. Mays.  Patient with history of chronic neck pain with recent exacerbation of the previous 2 months.  Denies any particular exacerbating and or inciting events.  His current pain is in the neck with radicular pain to the left arm.  There is associated paresthesias of the arm extending to the hand.  He also notes significant muscle atrophy of the hand and subjective strength loss.  Patient has been through PT with some benefit.  Of note patient also complains of chronic low back pain with recent exacerbation while in physical therapy.  This is actually more of a concern for him at today's visit than his cervical spine.  Pain remains focal to the lumbar spine without significant radiation to the buttock or lower extremities.  Patient is scheduled for a MRI of his lumbar spine this week.  Injections: Yes 1) Bilateral L4-L5, L5-S1 facet joint MBB (2025)  Pertinent Surgical History: N/A   Imagin) MRI lumbar Spine (1/15/2025) - [facility]  Physician Disclaimer: I have personally reviewed and confirmed all HPI data with the patient.  [] : Post Surgical Visit: no [FreeTextEntry7] : right leg, rt knee  [FreeTextEntry9] : Exercise, stretching  [de-identified] : working  [de-identified] : MRI lumbar Spine (1/15/2025) - [facility]

## 2025-06-19 NOTE — HISTORY OF PRESENT ILLNESS
[Lower back] : lower back [4] : 4 [Burning] : burning [Radiating] : radiating [Sharp] : sharp [Constant] : constant [Household chores] : household chores [Leisure] : leisure [Work] : work [Sleep] : sleep [Bending forward] : bending forward [6] : 6 [Injection therapy] : injection therapy [FreeTextEntry1] : 2025 - Patient presents today for a FUV after bilateral L4-L5, L5-S1 facet joint MBB on 2025. Patient reports 80-70% reduction in axial low back pain the day of the procedure ad a gradual return to baseline pain by the following day. The patient had a positive response to the diagnostic nerve block and wishes to proceed with a repeat at this time.  Denies any alarm signs or changes in medical history since last office visit.    2025 - Patient presents for 3-week FUV. Patient reports today with several questions regarding upcoming bilateral L4-L5, L5-S1 facet joint MBB on 2025. Patient reports pain remains stable and unchanged since last office visit.  Continues to report pain across the low back with activities that involve both forward flexion and extension.  Denies any bothersome radicular symptoms at this time. Denies any alarm signs or changes in medical history since last visit.   2025 - Patient presents for reevaluation; last seen about 2 years ago.  Patient reports he continues to have low back pain which he feels is limiting his ability to participate in day-to-day activities. His pain is across his low back, with occasional radiation to his right leg. His back pain is his predominant concern at this time. He has done formal PT for his low back and continues HEP without meaningful benefit. He also reports he is getting worsening numbness/tingling in his left upper extremity, down to his hand.  2023 - The patient presents for initial evaluation regarding their neck pain.  Patient was referred by Dr. Mays.  Patient with history of chronic neck pain with recent exacerbation of the previous 2 months.  Denies any particular exacerbating and or inciting events.  His current pain is in the neck with radicular pain to the left arm.  There is associated paresthesias of the arm extending to the hand.  He also notes significant muscle atrophy of the hand and subjective strength loss.  Patient has been through PT with some benefit.  Of note patient also complains of chronic low back pain with recent exacerbation while in physical therapy.  This is actually more of a concern for him at today's visit than his cervical spine.  Pain remains focal to the lumbar spine without significant radiation to the buttock or lower extremities.  Patient is scheduled for a MRI of his lumbar spine this week.  Injections: Yes 1) Bilateral L4-L5, L5-S1 facet joint MBB (2025)  Pertinent Surgical History: N/A   Imagin) MRI lumbar Spine (1/15/2025) - [facility]  Physician Disclaimer: I have personally reviewed and confirmed all HPI data with the patient.  [] : Post Surgical Visit: no [FreeTextEntry7] : right leg, rt knee  [FreeTextEntry9] : Exercise, stretching  [de-identified] : working  [de-identified] : MRI lumbar Spine (1/15/2025) - [facility]

## 2025-06-19 NOTE — PHYSICAL EXAM
[de-identified] : Constitutional:   - No acute distress   - Well developed; well nourished    Neurological:   - normal mood and affect   - alert and oriented x 3     Cardiovascular:   - grossly normal   Cervical Spine Exam:   Inspection:   erythema (-)   ecchymosis (-)   rashes (-)    Palpation:                                                    Cervical paraspinal tenderness:         R (-); L (-)  Upper trapezius tenderness:              R (-); L (+)  Rhomboids tenderness:                      R (-); L (+)  Occipital Ridge:                                    R (-); L (-)  Supraspinatus tenderness:                 R (-); L (-)   ROM: Reduced ROM all planes  Strength Testing:              Deltoid                           R (5/5); L (5/5)  Biceps:                          R (5/5); L (5/5)  Triceps:                         R (5/5); L (5/5)  Finger Abductors:         R (5/5); L (5/5)  Grasp:                           R (5/5); L (5/5)   Special Testing:  Spurling Test:                  R (-); L (=)  Facet load test:               R (-); L (-)   Neuro:  SILT throughout right upper extremity  SILT throughout left upper extremity   Reflexes:  Biceps   -           R (2+); L (2+)  Triceps  -           R (2+); L (2+)  Brachioradialis- R (2+); L (2+)     No ankle clonus  Some difficulty with tandem gait  Lumbar Spine Exam:   Inspection: erythema (-) ecchymosis (-) rashes (-) alignment: no scoliosis   Palpation: Midline lumbar tenderness:            (-) midline thoracic tenderness:          (-) Lumbar paraspinal tenderness:      L (+); R (+) thoracic paraspinal tenderness:     L (-); R (-) sciatic nerve tenderness :              L (-); R (-) SI joint tenderness:                        L (-); R (-) GTB tenderness:                            L (-); R (-)   ROM: reduced all planes pain with extremes of extension=flexion   Strength:                                    Right       Left  Hip Flexion:                (5/5)       (5/5) Quadriceps:               (5/5)       (5/5) Hamstrings:                (5/5)       (5/5) Ankle Dorsiflexion:     (5/5)       (5/5) EHL:                           (5/5)       (5/5) Ankle Plantarflexion:  (5/5)       (5/5)   Special Tests: SLR:                           R (=) ; L (=) Facet loading:            R (+) ; L (+) EUNICE test:               R (n/a) ; L (n/a) Hamstring tightness:  R (+);  L (+)   Neurologic: SI LT throughout right lower extremity SI LT throughout left lower extremity   Reflexes normal and symmetric bilateral lower extremities   Gait: non- antalgic gait ambulates without assistive device

## 2025-06-19 NOTE — PHYSICAL EXAM
[de-identified] : Constitutional:   - No acute distress   - Well developed; well nourished    Neurological:   - normal mood and affect   - alert and oriented x 3     Cardiovascular:   - grossly normal   Cervical Spine Exam:   Inspection:   erythema (-)   ecchymosis (-)   rashes (-)    Palpation:                                                    Cervical paraspinal tenderness:         R (-); L (-)  Upper trapezius tenderness:              R (-); L (+)  Rhomboids tenderness:                      R (-); L (+)  Occipital Ridge:                                    R (-); L (-)  Supraspinatus tenderness:                 R (-); L (-)   ROM: Reduced ROM all planes  Strength Testing:              Deltoid                           R (5/5); L (5/5)  Biceps:                          R (5/5); L (5/5)  Triceps:                         R (5/5); L (5/5)  Finger Abductors:         R (5/5); L (5/5)  Grasp:                           R (5/5); L (5/5)   Special Testing:  Spurling Test:                  R (-); L (=)  Facet load test:               R (-); L (-)   Neuro:  SILT throughout right upper extremity  SILT throughout left upper extremity   Reflexes:  Biceps   -           R (2+); L (2+)  Triceps  -           R (2+); L (2+)  Brachioradialis- R (2+); L (2+)     No ankle clonus  Some difficulty with tandem gait  Lumbar Spine Exam:   Inspection: erythema (-) ecchymosis (-) rashes (-) alignment: no scoliosis   Palpation: Midline lumbar tenderness:            (-) midline thoracic tenderness:          (-) Lumbar paraspinal tenderness:      L (+); R (+) thoracic paraspinal tenderness:     L (-); R (-) sciatic nerve tenderness :              L (-); R (-) SI joint tenderness:                        L (-); R (-) GTB tenderness:                            L (-); R (-)   ROM: reduced all planes pain with extremes of extension=flexion   Strength:                                    Right       Left  Hip Flexion:                (5/5)       (5/5) Quadriceps:               (5/5)       (5/5) Hamstrings:                (5/5)       (5/5) Ankle Dorsiflexion:     (5/5)       (5/5) EHL:                           (5/5)       (5/5) Ankle Plantarflexion:  (5/5)       (5/5)   Special Tests: SLR:                           R (=) ; L (=) Facet loading:            R (+) ; L (+) EUNICE test:               R (n/a) ; L (n/a) Hamstring tightness:  R (+);  L (+)   Neurologic: SI LT throughout right lower extremity SI LT throughout left lower extremity   Reflexes normal and symmetric bilateral lower extremities   Gait: non- antalgic gait ambulates without assistive device

## 2025-06-19 NOTE — REVIEW OF SYSTEMS
[Joint Pain] : joint pain [Feeling Tired] : fatigue [Urinary Frequency] : urinary frequency [Urinary Urgency] : urinary urgency [Negative] : Endocrine

## 2025-06-27 NOTE — DATA REVIEWED
[FreeTextEntry1] :  MRI Left shoulder [facility] 6/20/25 biceps tendonitis with subluxation medially  AC joint arthrosis  moderate diffuse interstitial partial tearing and delamination of supraspinatus and infraspinatus and mild partial tearing of the subscap

## 2025-06-27 NOTE — PROCEDURE
[FreeTextEntry3] : Large Joint Injection was performed because of pain and inflammation.   Anesthesia: ethyl chloride sprayed topically..  Kenalog: An injection of Kenalog 40 mg , 1 cc.   Lidocaine: 3 cc.   Medication was injected in the left bicipital grove. Patient has tried OTC's including aspirin, Ibuprofen, Aleve etc or prescription NSAIDS, and/or exercises at home and/ or physical therapy without satisfactory response and Patient has decreased mobility in the joint. After verbal consent using sterile preparation and technique. The risks, benefits, and alternatives to cortisone injection were explained in full to the patient. Risks outlined include but are not limited to infection, sepsis, bleeding, scarring, skin discoloration, temporary increase in pain, syncopal episode, failure to resolve symptoms, allergic reaction, symptom recurrence, and elevation of blood sugar in diabetics. Patient understood the risks. All questions were answered. After discussion of options, patient requested an injection. Oral informed consent was obtained and sterile prep was done of the injection site. Sterile technique was utilized for the procedure including the preparation of the solutions used for the injection. Patient tolerated the procedure well. Advised to ice the injection site this evening. Prep with alcohol locally to site. Sterile technique used. Patient tolerated procedure well. Post Procedure Instructions: Patient was advised to call if redness, pain, or fever occur and apply ice for 15 min. out of every hour for the next 12-24 hours as tolerated. patient was advised to rest the joint(s) for 7 days.   Ultrasound Guidance was used for the following reasons: prior failure or difficult injection.   Ultrasound guided injection was performed of the shoulder, visualization of the needle and placement of injection was performed without complication.

## 2025-06-27 NOTE — PHYSICAL EXAM
[de-identified] : Constitutional: The general appearance of the patient is well developed, well nourished, no deformities and well groomed. Normal   Gait: Gait and function is as follows: normal gait.   Skin: Head and neck visualized skin is normal. Left upper extremity visualized skin is normal. Right upper extremity visualized skin is normal. Thoracic Skin of the thoracic spine shows visualized skin is normal.   Cardiovascular: palpable radial pulse bilaterally, good capillary refill in digits of the bilateral upper extremities and no temperature or color changes in the bilateral upper extremities.   Lymphatic: Normal Palpation of lymph nodes in the cervical.   Neurologic: fine motor control in the bilateral upper extremities is intact. Deep Tendon Reflexes in Upper and Lower Extremities Negative Mckeon's in the bilateral upper extremities. The patient is oriented to time, place and person. Sensation to light touch intact in the bilateral upper extremities. Mood and Affect is normal.   Right Shoulder: Inspection of the shoulder/upper arm is as follows: There is mild pain with range of motion of the shoulder   Left Shoulder: Inspection of the shoulder/upper arm is as follows: no scapula winging, no biceps deformity and no AC joint deformity. Palpation of the shoulder/upper arm is as follows: There is tenderness at the proximal biceps tendon. Range of motion of the shoulder is as follows: Pain with internal rotation, external rotation, abduction and forward flexion. Strength of the shoulder is as follows: Supraspinatus 4/5. External Rotation 4/5. Internal Rotation 4/5. Deltoid 5/5 Ligament Stability and Special Tests of the shoulder is as follows: Neer test is positive. Hendrix' test is positive. Speed's test is positive.   Neck:   Inspection / Palpation of the cervical spine is as follows: mild paracervical tenderness. Range of motion of the cervical spine is as follows: moderately decreased range of motion of the cervical spine. Stability testing for the cervical spine is as follows Stable range of motion.   Back, including spine: Inspection / Palpation of the thoracic/lumbar spine is as follows: There is a full, pain free, stable range of motion of the thoracic spine with a normal tone and not tenderness to palpation..

## 2025-06-27 NOTE — DISCUSSION/SUMMARY
[de-identified] : This is a 62yo male presenting to the office c/o ongoing left shoulder pain x months.  He does see Dr. Mays for cervical radicular symptoms however his exam today is indicative of tenderness to the long head of the biceps tendon  I personally reviewed MRI which demonstrates medial subluxation of the biceps tendon without full-thickness rotator cuff tear maximum point of tenderness on LHBT  Pt was treated with an US guided biceps injection for diagnostic and therapeutic purposes  Pt can resume activities as tolerated Follow up 2 weeks   Patient reports that his activities in the gym are significantly limited by his shoulder.  We discussed that if this long head of biceps tendon injection is only temporarily affected, biceps tenodesis would be the surgery of choice.  We discussed that if patient is not improving with anti-inflammatories and activity modification he would be a candidate for biceps tenodesis. We discussed the perioperative restrictions and recovery time for the surgery.       Attestation: I, Debbie Eugene , attest that this documentation has been prepared under the direction and in the presence of Provider Gallo Thomas MD. The documentation recorded by the scribe, in my presence, accurately reflects the service I personally performed, and the decisions made by me with my edits as appropriate. Gallo Thomas MD

## 2025-06-27 NOTE — HISTORY OF PRESENT ILLNESS
[de-identified] : This is a 62yo male presenting to the office c/o ongoing left shoulder pain x months. Patient has been under the care of Dr. Mays for his lumbar issues. Patient fell 2 months ago in the rain. Patient has had anterior pain since the fall. Pain is described as constant, severe in quality. Currently pain is 7/10 and non-radiating. Patient reports pain has been getting progressively worse. Pain is worse at night. Overall pain does improve with rest and ice. Patient denies any numbness or tingling.

## 2025-07-02 NOTE — PROCEDURE
[FreeTextEntry3] : Date of Service: 07/02/2025   Account: [id number]  Patient: SELIN FREEMAN   YOB: 1961  Age: 63 years  Surgeon:                  Ubaldo Monique DO  Assistant:                None  Pre-Operative Diagnosis:   Lumbar Spondylosis      Post Operative Diagnosis:  Lumbar Spondylosis  Procedure:             Bilateral L4-5, L5-S1 facet block under fluoroscopic guidance.  Anesthesia:            MAC  This procedure was carried out using fluoroscopic guidance.  The risks and benefits of the procedure were discussed extensively with the patient.  The consent of the patient was obtained, and the following procedure was performed.  A timeout was performed with all essential staff present and the site and side were verified.  The lumbar vertebral bodies were identified, and the fluoroscope was obliqued ipsilateral to approximately 30 degrees to reveal the appropriate anatomical view.  The junction of the superior articulate process and transverse process at the right L4 and L5 levels were identified and marked.   The skin at these target points was then localized using 1 cc of 1% Lidocaine at each injection site.  A spinal needle was then introduced and advanced to the above target points at the junction of the SAP and transverse processes until bone was contacted.  Fluoroscope then focused on the right sacral ala on A/P view and marked at this point.  The skin and subcutaneous structures were localized using 1cc of 1.0 % lidocaine.  A spinal needle was then advanced under fluoroscopic guidance until bone was contacted at the ala.    After negative aspiration for heme and CSF 1 cc of 0.5% Marcaine was injected at each of the injection sites.  The procedure was performed in the exact same fashion on the contralateral left side at the L4, L5 and sacral ala levels.  Vital signs remained normal throughout the procedure.  The patient tolerated the procedure well.  There were no immediate complications from the performed procedure.  The patient was instructed to apply ice over the injection sites for twenty minutes every two hours for the next 24 hours.  Disposition:      1. The patient was advised to F/U in 1-2 weeks to assess the response to the injection.       2. They were advised to keep a pain diary to report the results of the diagnostic block at their FUV.      3. The patient was also instructed to contact me immediately if there were any concerns related to the procedure performed.

## 2025-07-12 NOTE — HISTORY OF PRESENT ILLNESS
[5] : 5 [0] : 0 [Retired] : Work status: retired [de-identified] : 07/09/2025 - Patient presents to review cervical MRI. Reports the sharp pain into his left distal arm has subsided, now experiencing numbness into the left hand. Feels numbness is starting to improve as well. However, notes weakness in his  strength but non progressive. Continues to complain of low back pain as well. Following up with Dr. Monique, reports transient relief from series of MBBs.   06/16/2025 - Patient returns for follow up visit. Reports increase in left shoulder and arm pain since his fall in early May. Following up with Dr. Monique for lumbar MBBs/RFA. Has tried to resume his physical activities in the gym but having difficulty due to left arm pain. Difficulty bench pressing. He also reports tremors in left hand returned for one month. He has no history of prior left shoulder subluxations.   05/16/2025 - Patient presenting for follow up visit. He reports improvement in atrophy and weakness in his LUE. Completed 3-5 months of PT and now continues active exercise based rehabilitation. About 2 wks ago he reports he fell which he feels exacerbated his LUE symptoms. Complains of radiating pain from left elbow into his first 3 digits, which he is able to trigger with cervical rom. He does not feel his strength has worsened. Treated with ibuprofen for 1.5 wk, stopped 3-4 days ago. Of note reports increase in LUE numbness starting 4 wks ago with increase in physical activities. Also complains increase in baseline level of pain in the low back.   01/22/2025: Patient presents for follow up visit and review of MRI.  Patient reports when he does home exercises he does feel relief. He states his right sided leg pain is beginning to resolve slowly. He does have pressure in his lower back. Patient does not take anything for pain. He does occasionally ice and heat.   01/03/2025: Patient is here today for a follow up visit. Patient reports right side L4-L5 has worsened. Reports resolving atrophy in regards to cervical spine. Still minor numbness, tingling in left arm. Current issue is worsened pain in lower back near tailbone. He is experiencing right sided leg pain that radiates to hip, knee.  06/19/2024: patient here for follow up visit. Reports 75-90% improvement in strength. Experiences intermittent numbness in the left arm and first 3 digits of hand. Reports he lost 21 pounds through exercising. Neck pain has improved, but still reports pain in lower back. Found that the more he exercises, the more he experiences pain in hips and low back. Left side - above buttock gets stiff; right side- sciatic radiates down leg to knee. Wants to continue to exercise and be active. Pain level normally is 1-2/10, but increases with exercise. Repetitive bending causes fatigue of lower lumbar. He reports a need for more frequent stretching to keep pain at bay. Heat started making pain worse 6 months ago. Reports minor loss of strength in right leg.   08/09/2023 - Patient presenting for a FUV. Chief complaint c/t to be pain more prevalent in the left-hand and diminished  strength. States pins/needles and numbness in the left forearm region, intermittent. Wore left UE brace for 6 wks. Underwent MMRI of the left upper extremity. Follows up with Dr. Cronin, recommended to now use brace at nighttime. No neck pain at this time.   6/14/2023 - Patient presenting for a follow up and MRI Review of L spine. He reports sharp pain on the hip bone present at his last visit has significant reduced. He complains of baseline stiffness focal to the RT low back, currently controlled. Denies any frequent sciatic like symptoms.   6/2/2023 - 60 y/o M presenting for an evaluation of C, T, and L spine. Patient states he fell out of bed - this fall resulted in central low back pain radiating into left paraspinal (7/10 pain level in office today). Also c/o sharp deep left hip pain on the bone. He reports cervical neck pain subsided after PT. However, he continues to have numbness / loss of function in the left am. Weakness remains present in the left hand. Atrophy in the left hand over the last few weeks. Left hand is his dominant hand. No right upper extremity symptoms. He has discontinued PT for the past 1.5 wk since his newer lumbar issues. Taking NSAID and completing PT and pains are breaking through.   5/19/23: Pt presents to office for follow up of C and T spine. Improvements since he was last seen. No more pain in the neck, left arm, and upper back. C/o intermittent tingling in the left upper extremity, worse at nighttime and lying down. Experiencing left hip pain after massage at acupuncture. Left upper extremity strength is gradually improving day by day. PT is helpful.   4/14/23 Pt presents to office for follow up of C and T spine. He is here today to review MRI results from [facility]. He continues to report pain which affects his ADLs. The pain is mostly on the left side. He use to weight lift and run which he stopped doing. Previous XR of thoracic and cervical spine reveals arthritis. No right sided upper extremity symptoms. He is continuing PT and taking medications as prescribed.   4/7/23: 60 y/o M presenting for an initial evaluation of T & C Spine. Chief complaint is left sided neck pain radiating into the shoulder blade/throughout left arm starting 14 days ago. No trauma. States pain started after sleeping. Pain is worse while lying down.Patient reports subjective weakness in the LT UE (50 % deficit). Patient received EKG, abnormalities were rules out. No right sided upper extremity symptoms. Pt was rxd MDP with no relief, and is currently taking gabapentin 300 mg and naproxen. Prescribed muscle relaxer provided minimal relief for approx 1 hr, he has d/c. Pmhx of DDD of the lumbar spine. Completing cervical HEP. Has not started PT due to severity of his pains.       [de-identified] : no

## 2025-07-12 NOTE — DATA REVIEWED
[EMG Nerve Conduction] : A EMG Nerve Conduction test was completed of the [Bilateral] : bilateral [Upper extremity] : upper extremity [FreeTextEntry1] : On my interpretation of these images & reports LT Shoulder MRI [facility] 6/20/25 Partial thickness tear supraspinatus infraspinatus   On my interpretation of these images & reports Cervical MRI [facility] 6/20/25  Multi level degenerative disease. 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 C7T1 R > L herniation with R C8 nerve impingement. C3/4 broad based herniation L > R  MRI Lumbar Spine from [place] Multilevel chronic DDD, no central stenosis no nerve root compression,  Moderate bilateral foraminal stenosis L4/L5. Modic changes L4-5, L2-3, L5S1  I stop paperwork reviewed PT progress notes reviewed

## 2025-07-12 NOTE — DISCUSSION/SUMMARY
[de-identified] : Cervical:  Cervical MRI demonstrates Multi level degenerative disease. 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 Broad based herniation C5/6 with L > R C6 nerve compression. C7T1 R > L herniation with R C8 nerve impingement. C3/4 broad based herniation L > R We've discussed ALL appropriate treatment options including- non-operative measures (NSAIDs, acupuncture, chiropractic care, physical therapy, nerve modulators & spine ESIs) and operative measures, candidate for ACDF.  Will remain conservative at this time. More urgency for surgery if there is progression of motor or neurological deficits.   Lumbar:  Patient will continue to follow up with Dr. Monique for Lumbar MBB/RFA procedures vs BVN ablation.  Discussed with patient that surgical intervention is an option, but we will try conservative treatment first, given that surgery would likely require a four level lumbar decompression and fusion with extended recovery time and may not meet patient expectations with respect to symptom relief. Continue Lumbar spine/core strengthening exercise program.   Cumulative encounter duration exceeded 30 minutes  Prior to appointment and during encounter with patient extensive medical records were reviewed including but not limited to, hospital records, outpatient records, imaging results, and lab data. During this appointment the patient was examined, diagnoses were discussed and explained in a face to face manner. In addition, extensive time was spent reviewing aforementioned diagnostic studies. Counseling including abnormal image results, differential diagnoses, treatment options, risk and benefits, lifestyle changes, current condition, and current medications was performed. Patient's comments, questions, and concerns were addressed and patient verbalized understanding. Based on this patient's presentation at our office, which is an orthopedic spine surgeon's office, this patient inherently / intrinsically has a risk, however minute, of developing issues such as Cauda equina syndrome, bowel and bladder changes, or progression of motor or neurological deficits such as paralysis which may be permanent.   KELYL ALAN Acting as a Scribe for Dr. Davon LEGER, Kelly Alan, attest that this documentation has been prepared under the direction and in the presence of Provider Basim Mays MD.

## 2025-07-12 NOTE — PHYSICAL EXAM
[Normal Coordination] : normal coordination [Normal DTR UE/LE] : normal DTR UE/LE  [Normal Sensation] : normal sensation [Normal Mood and Affect] : normal mood and affect [Oriented] : oriented [Able to Communicate] : able to communicate [Normal Skin] : normal skin [No Rash] : no rash [No Ulcers] : no ulcers [No Lesions] : no lesions [No obvious lymphadenopathy in areas examined] : no obvious lymphadenopathy in areas examined [Well Developed] : well developed [Peripheral vascular exam is grossly normal] : peripheral vascular exam is grossly normal [No Respiratory Distress] : no respiratory distress [NL (90)] : forward flexion 90 degrees [NL (30)] : right lateral rotation 30 degrees [NL (45)] : extension 45 degrees [NL (40)] : right lateral bending 40 degrees [Flexion] : flexion [Left] : left shoulder [5 ___] : forward flexion 5[unfilled]/5 [4___] : abduction 4[unfilled]/5 [5___] : external rotation 5[unfilled]/5 [3___] : internal rotation 3[unfilled]/5 [] : full strength is not present in all planes of motion [FreeTextEntry8] : Biceps tenderness

## 2025-07-12 NOTE — DATA REVIEWED
[EMG Nerve Conduction] : A EMG Nerve Conduction test was completed of the [Bilateral] : bilateral [Upper extremity] : upper extremity [FreeTextEntry1] : On my interpretation of these images & reports LT Shoulder MRI [facility] 6/20/25 Partial thickness tear supraspinatus infraspinatus   On my interpretation of these images & reports Cervical MRI [facility] 6/20/25  Multi level degenerative disease. 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 C7T1 R > L herniation with R C8 nerve impingement. C3/4 broad based herniation L > R  MRI Lumbar Spine from [place] Multilevel chronic DDD, no central stenosis no nerve root compression,  Moderate bilateral foraminal stenosis L4/L5. Modic changes L4-5, L2-3, L5S1  I stop paperwork reviewed PT progress notes reviewed

## 2025-07-12 NOTE — DISCUSSION/SUMMARY
[de-identified] : Cervical:  Cervical MRI demonstrates Multi level degenerative disease. 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 Broad based herniation C5/6 with L > R C6 nerve compression. C7T1 R > L herniation with R C8 nerve impingement. C3/4 broad based herniation L > R We've discussed ALL appropriate treatment options including- non-operative measures (NSAIDs, acupuncture, chiropractic care, physical therapy, nerve modulators & spine ESIs) and operative measures, candidate for ACDF.  Will remain conservative at this time. More urgency for surgery if there is progression of motor or neurological deficits.   Lumbar:  Patient will continue to follow up with Dr. Monique for Lumbar MBB/RFA procedures vs BVN ablation.  Discussed with patient that surgical intervention is an option, but we will try conservative treatment first, given that surgery would likely require a four level lumbar decompression and fusion with extended recovery time and may not meet patient expectations with respect to symptom relief. Continue Lumbar spine/core strengthening exercise program.   Cumulative encounter duration exceeded 30 minutes  Prior to appointment and during encounter with patient extensive medical records were reviewed including but not limited to, hospital records, outpatient records, imaging results, and lab data. During this appointment the patient was examined, diagnoses were discussed and explained in a face to face manner. In addition, extensive time was spent reviewing aforementioned diagnostic studies. Counseling including abnormal image results, differential diagnoses, treatment options, risk and benefits, lifestyle changes, current condition, and current medications was performed. Patient's comments, questions, and concerns were addressed and patient verbalized understanding. Based on this patient's presentation at our office, which is an orthopedic spine surgeon's office, this patient inherently / intrinsically has a risk, however minute, of developing issues such as Cauda equina syndrome, bowel and bladder changes, or progression of motor or neurological deficits such as paralysis which may be permanent.   KELLY ALAN Acting as a Scribe for Dr. Davon LEGER, Kelly Alan, attest that this documentation has been prepared under the direction and in the presence of Provider Basim Mays MD.

## 2025-07-12 NOTE — HISTORY OF PRESENT ILLNESS
[5] : 5 [0] : 0 [Retired] : Work status: retired [de-identified] : 07/09/2025 - Patient presents to review cervical MRI. Reports the sharp pain into his left distal arm has subsided, now experiencing numbness into the left hand. Feels numbness is starting to improve as well. However, notes weakness in his  strength but non progressive. Continues to complain of low back pain as well. Following up with Dr. Monique, reports transient relief from series of MBBs.   06/16/2025 - Patient returns for follow up visit. Reports increase in left shoulder and arm pain since his fall in early May. Following up with Dr. Monique for lumbar MBBs/RFA. Has tried to resume his physical activities in the gym but having difficulty due to left arm pain. Difficulty bench pressing. He also reports tremors in left hand returned for one month. He has no history of prior left shoulder subluxations.   05/16/2025 - Patient presenting for follow up visit. He reports improvement in atrophy and weakness in his LUE. Completed 3-5 months of PT and now continues active exercise based rehabilitation. About 2 wks ago he reports he fell which he feels exacerbated his LUE symptoms. Complains of radiating pain from left elbow into his first 3 digits, which he is able to trigger with cervical rom. He does not feel his strength has worsened. Treated with ibuprofen for 1.5 wk, stopped 3-4 days ago. Of note reports increase in LUE numbness starting 4 wks ago with increase in physical activities. Also complains increase in baseline level of pain in the low back.   01/22/2025: Patient presents for follow up visit and review of MRI.  Patient reports when he does home exercises he does feel relief. He states his right sided leg pain is beginning to resolve slowly. He does have pressure in his lower back. Patient does not take anything for pain. He does occasionally ice and heat.   01/03/2025: Patient is here today for a follow up visit. Patient reports right side L4-L5 has worsened. Reports resolving atrophy in regards to cervical spine. Still minor numbness, tingling in left arm. Current issue is worsened pain in lower back near tailbone. He is experiencing right sided leg pain that radiates to hip, knee.  06/19/2024: patient here for follow up visit. Reports 75-90% improvement in strength. Experiences intermittent numbness in the left arm and first 3 digits of hand. Reports he lost 21 pounds through exercising. Neck pain has improved, but still reports pain in lower back. Found that the more he exercises, the more he experiences pain in hips and low back. Left side - above buttock gets stiff; right side- sciatic radiates down leg to knee. Wants to continue to exercise and be active. Pain level normally is 1-2/10, but increases with exercise. Repetitive bending causes fatigue of lower lumbar. He reports a need for more frequent stretching to keep pain at bay. Heat started making pain worse 6 months ago. Reports minor loss of strength in right leg.   08/09/2023 - Patient presenting for a FUV. Chief complaint c/t to be pain more prevalent in the left-hand and diminished  strength. States pins/needles and numbness in the left forearm region, intermittent. Wore left UE brace for 6 wks. Underwent MMRI of the left upper extremity. Follows up with Dr. Cronin, recommended to now use brace at nighttime. No neck pain at this time.   6/14/2023 - Patient presenting for a follow up and MRI Review of L spine. He reports sharp pain on the hip bone present at his last visit has significant reduced. He complains of baseline stiffness focal to the RT low back, currently controlled. Denies any frequent sciatic like symptoms.   6/2/2023 - 60 y/o M presenting for an evaluation of C, T, and L spine. Patient states he fell out of bed - this fall resulted in central low back pain radiating into left paraspinal (7/10 pain level in office today). Also c/o sharp deep left hip pain on the bone. He reports cervical neck pain subsided after PT. However, he continues to have numbness / loss of function in the left am. Weakness remains present in the left hand. Atrophy in the left hand over the last few weeks. Left hand is his dominant hand. No right upper extremity symptoms. He has discontinued PT for the past 1.5 wk since his newer lumbar issues. Taking NSAID and completing PT and pains are breaking through.   5/19/23: Pt presents to office for follow up of C and T spine. Improvements since he was last seen. No more pain in the neck, left arm, and upper back. C/o intermittent tingling in the left upper extremity, worse at nighttime and lying down. Experiencing left hip pain after massage at acupuncture. Left upper extremity strength is gradually improving day by day. PT is helpful.   4/14/23 Pt presents to office for follow up of C and T spine. He is here today to review MRI results from [facility]. He continues to report pain which affects his ADLs. The pain is mostly on the left side. He use to weight lift and run which he stopped doing. Previous XR of thoracic and cervical spine reveals arthritis. No right sided upper extremity symptoms. He is continuing PT and taking medications as prescribed.   4/7/23: 60 y/o M presenting for an initial evaluation of T & C Spine. Chief complaint is left sided neck pain radiating into the shoulder blade/throughout left arm starting 14 days ago. No trauma. States pain started after sleeping. Pain is worse while lying down.Patient reports subjective weakness in the LT UE (50 % deficit). Patient received EKG, abnormalities were rules out. No right sided upper extremity symptoms. Pt was rxd MDP with no relief, and is currently taking gabapentin 300 mg and naproxen. Prescribed muscle relaxer provided minimal relief for approx 1 hr, he has d/c. Pmhx of DDD of the lumbar spine. Completing cervical HEP. Has not started PT due to severity of his pains.       [de-identified] : no

## 2025-07-17 NOTE — REVIEW OF SYSTEMS
[Joint Pain] : joint pain [Feeling Tired] : fatigue [Urinary Frequency] : urinary frequency [Urinary Urgency] : urinary urgency [Negative] : Heme/Lymph

## 2025-07-17 NOTE — PHYSICAL EXAM
[de-identified] : Constitutional:   - No acute distress   - Well developed; well nourished    Neurological:   - normal mood and affect   - alert and oriented x 3     Cardiovascular:   - grossly normal   Cervical Spine Exam:   Inspection:   erythema (-)   ecchymosis (-)   rashes (-)    Palpation:                                                    Cervical paraspinal tenderness:         R (-); L (-)  Upper trapezius tenderness:              R (-); L (+)  Rhomboids tenderness:                      R (-); L (+)  Occipital Ridge:                                    R (-); L (-)  Supraspinatus tenderness:                 R (-); L (-)   ROM: Reduced ROM all planes  Strength Testing:              Deltoid                           R (5/5); L (5/5)  Biceps:                          R (5/5); L (5/5)  Triceps:                         R (5/5); L (5/5)  Finger Abductors:         R (5/5); L (5/5)  Grasp:                           R (5/5); L (5/5)   Special Testing:  Spurling Test:                  R (-); L (=)  Facet load test:               R (-); L (-)   Neuro:  SILT throughout right upper extremity  SILT throughout left upper extremity   Reflexes:  Biceps   -           R (2+); L (2+)  Triceps  -           R (2+); L (2+)  Brachioradialis- R (2+); L (2+)     No ankle clonus  Some difficulty with tandem gait  Lumbar Spine Exam:   Inspection: erythema (-) ecchymosis (-) rashes (-) alignment: no scoliosis   Palpation: Midline lumbar tenderness:            (-) midline thoracic tenderness:          (-) Lumbar paraspinal tenderness:      L (+); R (+) thoracic paraspinal tenderness:     L (-); R (-) sciatic nerve tenderness :              L (-); R (-) SI joint tenderness:                        L (-); R (-) GTB tenderness:                            L (-); R (-)   ROM: reduced all planes pain with extremes of extension=flexion   Strength:                                    Right       Left  Hip Flexion:                (5/5)       (5/5) Quadriceps:               (5/5)       (5/5) Hamstrings:                (5/5)       (5/5) Ankle Dorsiflexion:     (5/5)       (5/5) EHL:                           (5/5)       (5/5) Ankle Plantarflexion:  (5/5)       (5/5)   Special Tests: SLR:                           R (=) ; L (=) Facet loading:            R (+) ; L (+) EUNICE test:               R (n/a) ; L (n/a) Hamstring tightness:  R (+);  L (+)   Neurologic: SI LT throughout right lower extremity SI LT throughout left lower extremity   Reflexes normal and symmetric bilateral lower extremities   Gait: non- antalgic gait ambulates without assistive device

## 2025-07-17 NOTE — HISTORY OF PRESENT ILLNESS
[Lower back] : lower back [6] : 6 [4] : 4 [Burning] : burning [Radiating] : radiating [Sharp] : sharp [Constant] : constant [Household chores] : household chores [Leisure] : leisure [Work] : work [Sleep] : sleep [Injection therapy] : injection therapy [Bending forward] : bending forward [FreeTextEntry1] : 2025 - Patient presents today for a FUV after bilateral L4-L5, L5-S1 facet joint MBB on 2025, Patient reports 80% reduction in axial low back pain. Patient had a positive response to a repeat diagnostic nerve block and wishes to proceed with the facet joint RFA at this time.   Patient also presents today for imaging review of his cervical spine. Patient reports chronic, long-standing neck pain which was exacerbated by a fall in 2025. He also reports left shoulder pain s/p fall which has been evaluated by Martha Astudillo. Patient recently received a left shoulder CSI with meaningful benefit thus far. Neck pain is present to the neck with radiation to the left upper extremity. Paresthesias reported to the 3rd, 4th, and 5th digits of the left hand and subjective weakness reported as well. Denies any alarm signs or changes in medical history since last office visit.  Presents today to discuss next steps in treatment plan with the hope of decreasing pain and increasing his functionality and quality of life.  2025 - Patient presents today for a FUV after bilateral L4-L5, L5-S1 facet joint MBB on 2025. Patient reports 80-70% reduction in axial low back pain the day of the procedure ad a gradual return to baseline pain by the following day. The patient had a positive response to the diagnostic nerve block and wishes to proceed with a repeat at this time.  Denies any alarm signs or changes in medical history since last office visit.    2025 - Patient presents for 3-week FUV. Patient reports today with several questions regarding upcoming bilateral L4-L5, L5-S1 facet joint MBB on 2025. Patient reports pain remains stable and unchanged since last office visit.  Continues to report pain across the low back with activities that involve both forward flexion and extension.  Denies any bothersome radicular symptoms at this time. Denies any alarm signs or changes in medical history since last visit.   2025 - Patient presents for reevaluation; last seen about 2 years ago.  Patient reports he continues to have low back pain which he feels is limiting his ability to participate in day-to-day activities. His pain is across his low back, with occasional radiation to his right leg. His back pain is his predominant concern at this time. He has done formal PT for his low back and continues HEP without meaningful benefit. He also reports he is getting worsening numbness/tingling in his left upper extremity, down to his hand.  2023 - The patient presents for initial evaluation regarding their neck pain.  Patient was referred by Dr. Mays.  Patient with history of chronic neck pain with recent exacerbation of the previous 2 months.  Denies any particular exacerbating and or inciting events.  His current pain is in the neck with radicular pain to the left arm.  There is associated paresthesias of the arm extending to the hand.  He also notes significant muscle atrophy of the hand and subjective strength loss.  Patient has been through PT with some benefit.  Of note patient also complains of chronic low back pain with recent exacerbation while in physical therapy.  This is actually more of a concern for him at today's visit than his cervical spine.  Pain remains focal to the lumbar spine without significant radiation to the buttock or lower extremities.  Patient is scheduled for a MRI of his lumbar spine this week.  Injections: Yes 1) Bilateral L4-L5, L5-S1 facet joint MBB (2025, 2025)  Pertinent Surgical History: N/A   Imagin) MRI Cervical Spine (2025) - [facility]  2) MRI lumbar Spine (1/15/2025) - [facility]  Physician Disclaimer: I have personally reviewed and confirmed all HPI data with the patient.    [] : Post Surgical Visit: no [FreeTextEntry7] : right leg, rt knee, rt hip [FreeTextEntry9] : Exercise, stretching  [de-identified] : working  [de-identified] : MRI lumbar Spine (1/15/2025) - [facility]

## 2025-07-17 NOTE — ASSESSMENT
[FreeTextEntry1] : A discussion regarding available pain management treatment options occurred with the patient.  These included interventional, rehabilitative, pharmacological, and alternative modalities. We will proceed with the following:    Interventional treatment options:   - Proceed with bilateral L4-L5, 5-S1 facet joint RFA with fluoroscopic guidance - Proceed with left PM C7-T1 VIOLETTA with fluoroscopic guidance; patient wishes to pursue low back treatment first - Patient is candidate for L3-S1 BVN ablation (Intracept procedure) for vertebrogenic pain component; informational materials provided and discussed  - see additional instructions below    Rehabilitative options: - Completed prior physical therapy trials; consider repeat once adequate relief - participation in active HEP was discussed and encouraged as tolerated  Medication based treatment options:   - Continue naproxen 500 mg up to BID as needed - continue methocarbamol 750 mg up to BID as needed for spasm - see additional instructions below    Complementary treatment options:   - Weight management and lifestyle modifications discussed   Additional treatment recommendations as follows:   - Patient will follow up with Dr. Mays as directed - Follow up 4-6 weeks post RFA for assessment of efficacy and further treatment recommendations  The risks, benefits and alternatives of the proposed procedure were explained in detail with the patient. The risks outlined include but are not limited to infection, bleeding, post- dural puncture headache, nerve injury, a temporary increase in pain, failure to resolve symptoms, need for future interventions, allergic reaction, and possible elevation of blood sugar in diabetics if using corticosteroid.  All questions were answered to patient's apparent satisfaction, and he/she verbalized an understanding.  Patient has lumbosacral axial pain that is consistent with facet joint pathology.  A diagnostic temporary block with local anesthetic of the medial branch was performed and has resulted in at least a 50% reduction in pain for the duration of the specific local anesthetic effect.  The pain is not radicular and there is absence of nerve root compression.  There is no prior spinal fusion surgery at the level targeted.  The pain has failed to respond to three months of conservative therapy.  We have discussed the risks, benefits, and alternatives NSAID therapy including but not limited to the risk of bleeding, thrombosis, gastric mucosal irritation/ulceration, allergic reaction and kidney dysfunction; the patient verbalizes an understanding.

## 2025-07-17 NOTE — PHYSICAL EXAM
[de-identified] : Constitutional:   - No acute distress   - Well developed; well nourished    Neurological:   - normal mood and affect   - alert and oriented x 3     Cardiovascular:   - grossly normal   Cervical Spine Exam:   Inspection:   erythema (-)   ecchymosis (-)   rashes (-)    Palpation:                                                    Cervical paraspinal tenderness:         R (-); L (-)  Upper trapezius tenderness:              R (-); L (+)  Rhomboids tenderness:                      R (-); L (+)  Occipital Ridge:                                    R (-); L (-)  Supraspinatus tenderness:                 R (-); L (-)   ROM: Reduced ROM all planes  Strength Testing:              Deltoid                           R (5/5); L (5/5)  Biceps:                          R (5/5); L (5/5)  Triceps:                         R (5/5); L (5/5)  Finger Abductors:         R (5/5); L (5/5)  Grasp:                           R (5/5); L (5/5)   Special Testing:  Spurling Test:                  R (-); L (=)  Facet load test:               R (-); L (-)   Neuro:  SILT throughout right upper extremity  SILT throughout left upper extremity   Reflexes:  Biceps   -           R (2+); L (2+)  Triceps  -           R (2+); L (2+)  Brachioradialis- R (2+); L (2+)     No ankle clonus  Some difficulty with tandem gait  Lumbar Spine Exam:   Inspection: erythema (-) ecchymosis (-) rashes (-) alignment: no scoliosis   Palpation: Midline lumbar tenderness:            (-) midline thoracic tenderness:          (-) Lumbar paraspinal tenderness:      L (+); R (+) thoracic paraspinal tenderness:     L (-); R (-) sciatic nerve tenderness :              L (-); R (-) SI joint tenderness:                        L (-); R (-) GTB tenderness:                            L (-); R (-)   ROM: reduced all planes pain with extremes of extension=flexion   Strength:                                    Right       Left  Hip Flexion:                (5/5)       (5/5) Quadriceps:               (5/5)       (5/5) Hamstrings:                (5/5)       (5/5) Ankle Dorsiflexion:     (5/5)       (5/5) EHL:                           (5/5)       (5/5) Ankle Plantarflexion:  (5/5)       (5/5)   Special Tests: SLR:                           R (=) ; L (=) Facet loading:            R (+) ; L (+) EUNICE test:               R (n/a) ; L (n/a) Hamstring tightness:  R (+);  L (+)   Neurologic: SI LT throughout right lower extremity SI LT throughout left lower extremity   Reflexes normal and symmetric bilateral lower extremities   Gait: non- antalgic gait ambulates without assistive device

## 2025-07-17 NOTE — HISTORY OF PRESENT ILLNESS
[Lower back] : lower back [6] : 6 [4] : 4 [Burning] : burning [Radiating] : radiating [Sharp] : sharp [Constant] : constant [Household chores] : household chores [Leisure] : leisure [Work] : work [Sleep] : sleep [Injection therapy] : injection therapy [Bending forward] : bending forward [FreeTextEntry1] : 2025 - Patient presents today for a FUV after bilateral L4-L5, L5-S1 facet joint MBB on 2025, Patient reports 80% reduction in axial low back pain. Patient had a positive response to a repeat diagnostic nerve block and wishes to proceed with the facet joint RFA at this time.   Patient also presents today for imaging review of his cervical spine. Patient reports chronic, long-standing neck pain which was exacerbated by a fall in 2025. He also reports left shoulder pain s/p fall which has been evaluated by Martha Astudillo. Patient recently received a left shoulder CSI with meaningful benefit thus far. Neck pain is present to the neck with radiation to the left upper extremity. Paresthesias reported to the 3rd, 4th, and 5th digits of the left hand and subjective weakness reported as well. Denies any alarm signs or changes in medical history since last office visit.  Presents today to discuss next steps in treatment plan with the hope of decreasing pain and increasing his functionality and quality of life.  2025 - Patient presents today for a FUV after bilateral L4-L5, L5-S1 facet joint MBB on 2025. Patient reports 80-70% reduction in axial low back pain the day of the procedure ad a gradual return to baseline pain by the following day. The patient had a positive response to the diagnostic nerve block and wishes to proceed with a repeat at this time.  Denies any alarm signs or changes in medical history since last office visit.    2025 - Patient presents for 3-week FUV. Patient reports today with several questions regarding upcoming bilateral L4-L5, L5-S1 facet joint MBB on 2025. Patient reports pain remains stable and unchanged since last office visit.  Continues to report pain across the low back with activities that involve both forward flexion and extension.  Denies any bothersome radicular symptoms at this time. Denies any alarm signs or changes in medical history since last visit.   2025 - Patient presents for reevaluation; last seen about 2 years ago.  Patient reports he continues to have low back pain which he feels is limiting his ability to participate in day-to-day activities. His pain is across his low back, with occasional radiation to his right leg. His back pain is his predominant concern at this time. He has done formal PT for his low back and continues HEP without meaningful benefit. He also reports he is getting worsening numbness/tingling in his left upper extremity, down to his hand.  2023 - The patient presents for initial evaluation regarding their neck pain.  Patient was referred by Dr. Mays.  Patient with history of chronic neck pain with recent exacerbation of the previous 2 months.  Denies any particular exacerbating and or inciting events.  His current pain is in the neck with radicular pain to the left arm.  There is associated paresthesias of the arm extending to the hand.  He also notes significant muscle atrophy of the hand and subjective strength loss.  Patient has been through PT with some benefit.  Of note patient also complains of chronic low back pain with recent exacerbation while in physical therapy.  This is actually more of a concern for him at today's visit than his cervical spine.  Pain remains focal to the lumbar spine without significant radiation to the buttock or lower extremities.  Patient is scheduled for a MRI of his lumbar spine this week.  Injections: Yes 1) Bilateral L4-L5, L5-S1 facet joint MBB (2025, 2025)  Pertinent Surgical History: N/A   Imagin) MRI Cervical Spine (2025) - [facility]  2) MRI lumbar Spine (1/15/2025) - [facility]  Physician Disclaimer: I have personally reviewed and confirmed all HPI data with the patient.    [] : Post Surgical Visit: no [FreeTextEntry7] : right leg, rt knee, rt hip [FreeTextEntry9] : Exercise, stretching  [de-identified] : working  [de-identified] : MRI lumbar Spine (1/15/2025) - [facility]